# Patient Record
Sex: MALE | Employment: FULL TIME | URBAN - METROPOLITAN AREA
[De-identification: names, ages, dates, MRNs, and addresses within clinical notes are randomized per-mention and may not be internally consistent; named-entity substitution may affect disease eponyms.]

---

## 2017-01-26 DIAGNOSIS — I10 ESSENTIAL HYPERTENSION: Primary | ICD-10-CM

## 2017-01-26 RX ORDER — LISINOPRIL/HYDROCHLOROTHIAZIDE 10-12.5 MG
1 TABLET ORAL DAILY
Qty: 30 TABLET | Refills: 0 | Status: SHIPPED | OUTPATIENT
Start: 2017-01-26 | End: 2017-03-24

## 2017-01-26 NOTE — TELEPHONE ENCOUNTER
lisinopril-hydrochlorothiazide       Last Written Prescription Date: 12/29/16  Last Fill Quantity: 30, # refills: 0  Last Office Visit with G, P or [facility] prescribing provider: 10/13/16       POTASSIUM   Date Value Ref Range Status   08/17/2016 4.1 3.4 - 5.3 mmol/L Final     Comment:     Specimen slightly hemolyzed, potassium may be falsely elevated     CREATININE   Date Value Ref Range Status   08/17/2016 0.77 0.66 - 1.25 mg/dL Final     BP Readings from Last 3 Encounters:   10/13/16 163/90   10/07/16 150/100   08/17/16 178/108

## 2017-03-24 DIAGNOSIS — I10 ESSENTIAL HYPERTENSION: ICD-10-CM

## 2017-03-24 RX ORDER — LISINOPRIL/HYDROCHLOROTHIAZIDE 10-12.5 MG
1 TABLET ORAL DAILY
Qty: 30 TABLET | Refills: 0 | Status: SHIPPED | OUTPATIENT
Start: 2017-03-24 | End: 2017-04-25

## 2017-03-24 NOTE — TELEPHONE ENCOUNTER
lisinopril-hydrochlorothiazide (PRINZIDE/ZESTORETIC) 10-12.5 MG per tablet      Last Written Prescription Date: 1/26/2017  Last Fill Quantity: 30, # refills: 0  Last Office Visit with G, [facility] or [facility] prescribing provider: 8/17/2016       Potassium   Date Value Ref Range Status   08/17/2016 4.1 3.4 - 5.3 mmol/L Final     Comment:     Specimen slightly hemolyzed, potassium may be falsely elevated     Creatinine   Date Value Ref Range Status   08/17/2016 0.77 0.66 - 1.25 mg/dL Final     BP Readings from Last 3 Encounters:   10/13/16 163/90   10/07/16 (!) 150/100   08/17/16 (!) 178/108

## 2017-04-25 DIAGNOSIS — I10 ESSENTIAL HYPERTENSION: ICD-10-CM

## 2017-04-25 NOTE — TELEPHONE ENCOUNTER
lisinopri/hctz      Last Written Prescription Date: 3/24/17  Last Fill Quantity: 30, # refills: 0  Last Office Visit with G, P or [facility] prescribing provider: 8/17/16       Potassium   Date Value Ref Range Status   08/17/2016 4.1 3.4 - 5.3 mmol/L Final     Comment:     Specimen slightly hemolyzed, potassium may be falsely elevated     Creatinine   Date Value Ref Range Status   08/17/2016 0.77 0.66 - 1.25 mg/dL Final     BP Readings from Last 3 Encounters:   10/13/16 163/90   10/07/16 (!) 150/100   08/17/16 (!) 178/108

## 2017-04-26 RX ORDER — LISINOPRIL/HYDROCHLOROTHIAZIDE 10-12.5 MG
TABLET ORAL
Qty: 30 TABLET | Refills: 0 | Status: SHIPPED | OUTPATIENT
Start: 2017-04-26 | End: 2017-07-01

## 2017-07-01 DIAGNOSIS — I10 ESSENTIAL HYPERTENSION: ICD-10-CM

## 2017-07-05 NOTE — TELEPHONE ENCOUNTER
lisinopril      Last Written Prescription Date: 04/26/17  Last Fill Quantity: 30, # refills: 0  Last Office Visit with G, P or [facility] prescribing provider: 08/17/16       Potassium   Date Value Ref Range Status   08/17/2016 4.1 3.4 - 5.3 mmol/L Final     Comment:     Specimen slightly hemolyzed, potassium may be falsely elevated     Creatinine   Date Value Ref Range Status   08/17/2016 0.77 0.66 - 1.25 mg/dL Final     BP Readings from Last 3 Encounters:   10/13/16 163/90   10/07/16 (!) 150/100   08/17/16 (!) 178/108

## 2017-07-06 RX ORDER — LISINOPRIL/HYDROCHLOROTHIAZIDE 10-12.5 MG
TABLET ORAL
Qty: 30 TABLET | Refills: 0 | Status: SHIPPED | OUTPATIENT
Start: 2017-07-06 | End: 2017-07-14 | Stop reason: DRUGHIGH

## 2017-07-07 ENCOUNTER — OFFICE VISIT (OUTPATIENT)
Dept: FAMILY MEDICINE | Facility: CLINIC | Age: 42
End: 2017-07-07
Payer: COMMERCIAL

## 2017-07-07 VITALS
WEIGHT: 240 LBS | SYSTOLIC BLOOD PRESSURE: 178 MMHG | DIASTOLIC BLOOD PRESSURE: 100 MMHG | BODY MASS INDEX: 38.45 KG/M2 | OXYGEN SATURATION: 100 % | TEMPERATURE: 97.5 F | HEART RATE: 89 BPM

## 2017-07-07 DIAGNOSIS — E11.9 TYPE 2 DIABETES MELLITUS WITHOUT COMPLICATION, WITHOUT LONG-TERM CURRENT USE OF INSULIN (H): ICD-10-CM

## 2017-07-07 DIAGNOSIS — S29.019A STRAIN OF THORACIC SPINE, INITIAL ENCOUNTER: ICD-10-CM

## 2017-07-07 DIAGNOSIS — I10 HYPERTENSION, GOAL BELOW 140/90: Primary | ICD-10-CM

## 2017-07-07 DIAGNOSIS — E66.01 SEVERE OBESITY (BMI 35.0-39.9): ICD-10-CM

## 2017-07-07 DIAGNOSIS — M54.6 ACUTE RIGHT-SIDED THORACIC BACK PAIN: ICD-10-CM

## 2017-07-07 PROCEDURE — 99214 OFFICE O/P EST MOD 30 MIN: CPT | Performed by: NURSE PRACTITIONER

## 2017-07-07 RX ORDER — LISINOPRIL AND HYDROCHLOROTHIAZIDE 20; 25 MG/1; MG/1
1 TABLET ORAL DAILY
Qty: 90 TABLET | Refills: 3 | Status: ON HOLD | OUTPATIENT
Start: 2017-07-07 | End: 2019-03-02

## 2017-07-07 RX ORDER — CYCLOBENZAPRINE HCL 10 MG
10 TABLET ORAL 3 TIMES DAILY PRN
Qty: 60 TABLET | Refills: 1 | Status: SHIPPED | OUTPATIENT
Start: 2017-07-07 | End: 2017-07-14

## 2017-07-07 RX ORDER — HYDROCODONE BITARTRATE AND ACETAMINOPHEN 5; 325 MG/1; MG/1
1-2 TABLET ORAL EVERY 8 HOURS PRN
Qty: 20 TABLET | Refills: 0 | Status: SHIPPED | OUTPATIENT
Start: 2017-07-07 | End: 2017-07-18

## 2017-07-07 RX ORDER — ACETAMINOPHEN 325 MG/1
650 TABLET ORAL EVERY 4 HOURS PRN
Qty: 100 TABLET | Refills: 0 | Status: SHIPPED | OUTPATIENT
Start: 2017-07-07

## 2017-07-07 ASSESSMENT — PAIN SCALES - GENERAL: PAINLEVEL: SEVERE PAIN (7)

## 2017-07-07 NOTE — PATIENT INSTRUCTIONS
Norco is for breakthrough pain. This will not be refilled. If you take, take in substitution of a tylenol to make sure you're not taking too much tylenol    Follow up in 1 week    I increased your blood pressure medication. Take 2 tablets until you run out and then call pharmacy to  your new prescription

## 2017-07-07 NOTE — NURSING NOTE
"Chief Complaint   Patient presents with     Back Pain       Initial BP (!) 187/114 (BP Location: Right arm, Patient Position: Chair, Cuff Size: Adult Large)  Pulse 89  Temp 97.5  F (36.4  C) (Oral)  Wt 240 lb (108.9 kg)  SpO2 100%  BMI 38.45 kg/m2 Estimated body mass index is 38.45 kg/(m^2) as calculated from the following:    Height as of 10/13/16: 5' 6.25\" (1.683 m).    Weight as of this encounter: 240 lb (108.9 kg).  Medication Reconciliation: complete.    EDWIGE Mendoza MA      "

## 2017-07-07 NOTE — PROGRESS NOTES
SUBJECTIVE:                                                    Eliseo Sims is a 42 year old male who presents to clinic today for the following health issues:      Patient is here today with Right lower back pain, has thrown it out again 2 days ago.    Patient also has not been taking his BP meds. Very  Well and today his blood pressure is very high.    He stopped all of his medications a few months ago  Can not give a reason  Reports his diabetes meds were DC'd after intended weight loss  Denies headache, dizziness, vision change, word finding difficulty, nausea    Is a   Denies injury or trauma  Seen for low back pain in the past  2 days ago developed new type of low back pain without hx of injury. This is worse than previously  Right low back pain  Radiates midline with certain positions  Denies paresthesias  Denies bowel or bladder incontinence  Took ibuprofen. A friend gave him a painkiller (not sure the name) made him tired.         Problem list and histories reviewed & adjusted, as indicated.  Additional history: none    Patient Active Problem List   Diagnosis     Hyperlipidemia LDL goal <100     Type 2 diabetes mellitus without complication (H)     Vitamin D deficiency     Former smoker     overweight     Essential hypertension     History reviewed. No pertinent surgical history.    Social History   Substance Use Topics     Smoking status: Former Smoker     Packs/day: 1.50     Years: 16.00     Types: Cigarettes     Quit date: 4/17/2013     Smokeless tobacco: Never Used     Alcohol use Yes      Comment: 10 drinks weekly     Family History   Problem Relation Age of Onset     Family History Negative Father      Arthritis Mother          Current Outpatient Prescriptions   Medication Sig Dispense Refill     lisinopril-hydrochlorothiazide (PRINZIDE/ZESTORETIC) 20-25 MG per tablet Take 1 tablet by mouth daily 90 tablet 3     cyclobenzaprine (FLEXERIL) 10 MG tablet Take 1 tablet (10 mg) by  mouth 3 times daily as needed for muscle spasms 60 tablet 1     HYDROcodone-acetaminophen (NORCO) 5-325 MG per tablet Take 1-2 tablets by mouth every 8 hours as needed for pain maximum 3 tablet(s) per day 20 tablet 0     acetaminophen (TYLENOL) 325 MG tablet Take 2 tablets (650 mg) by mouth every 4 hours as needed for mild pain 100 tablet 0     lisinopril-hydrochlorothiazide (PRINZIDE/ZESTORETIC) 10-12.5 MG per tablet TAKE 1 TABLET BY MOUTH EVERY DAY (Patient not taking: Reported on 7/7/2017) 30 tablet 0     cyclobenzaprine (FLEXERIL) 10 MG tablet Take 0.5-1 tablets (5-10 mg) by mouth 2 times daily as needed for muscle spasms (Patient not taking: Reported on 7/7/2017) 30 tablet 0     Methocarbamol (ROBAXIN PO)        metFORMIN (GLUCOPHAGE) 1000 MG tablet Take 1 tablet (1,000 mg) by mouth 2 times daily (with meals) Due for diabetes and blood pressue follow up visit. (Patient not taking: Reported on 7/7/2017) 60 tablet 0     UNABLE TO FIND Please give pt. Either brand name Roche or [facility] Diabetic Kit.( Per his insurance.)  Also fill Test stripes:  He test up to 3 times a day. (per his insurance)  Lancets:  Up to 3 times a day (per his insurance) (Patient not taking: Reported on 7/7/2017) 270 each 3     glucose blood VI test strips (ONE TOUCH ULTRA TEST) strip Use to test blood sugars two times daily or as directed. (Patient not taking: Reported on 7/7/2017) 100 strip 3     NEW MED 12mg. Dose E cigs.        Blood Glucose Monitoring Suppl (BLOOD GLUCOSE METER) KIT 1 Device 2 times daily. (Patient not taking: Reported on 7/7/2017) 1 kit 0     BL LANCETS MISC 1 Device 2 times daily. (Patient not taking: Reported on 7/7/2017) 180 each 3       Reviewed and updated as needed this visit by clinical staff  Tobacco  Allergies  Meds  Med Hx  Surg Hx  Fam Hx  Soc Hx      Reviewed and updated as needed this visit by Provider         ROS:  Constitutional, HEENT, cardiovascular, pulmonary, gi and gu systems are negative,  except as otherwise noted.    OBJECTIVE:     BP (!) 178/100  Pulse 89  Temp 97.5  F (36.4  C) (Oral)  Wt 240 lb (108.9 kg)  SpO2 100%  BMI 38.45 kg/m2  Body mass index is 38.45 kg/(m^2).  GENERAL: healthy, alert and no distress  NECK: no adenopathy, no asymmetry, masses, or scars and thyroid normal to palpation  RESP: lungs clear to auscultation - no rales, rhonchi or wheezes  CV: regular rate and rhythm, normal S1 S2, no S3 or S4, no murmur, click or rub, no peripheral edema and peripheral pulses strong  MS: No pain to palpation thoracic or lumbar spinous processes. Pain to palpation right paraspinal thoracic region. Pain with flexion, extension and lateral rotation thoracic spine. Upper and lower extremity strenght 5/5 and symmetric  SKIN: no suspicious lesions or rashes  NEURO: Normal strength and tone, mentation intact and speech normal, sensation grossly intact  PSYCH: mentation appears normal, affect normal/bright    Diagnostic Test Results:  none     ASSESSMENT/PLAN:       ICD-10-CM    1. Hypertension, goal below 140/90 I10 lisinopril-hydrochlorothiazide (PRINZIDE/ZESTORETIC) 20-25 MG per tablet   2. Type 2 diabetes mellitus without complication, without long-term current use of insulin (H) E11.9 Hemoglobin A1c     Basic metabolic panel  (Ca, Cl, CO2, Creat, Gluc, K, Na, BUN)     CANCELED: HEMOGLOBIN A1C     CANCELED: Basic metabolic panel   3. Severe obesity (BMI 35.0-39.9) (H) E66.01    4. Acute right-sided thoracic back pain M54.6 cyclobenzaprine (FLEXERIL) 10 MG tablet     HYDROcodone-acetaminophen (NORCO) 5-325 MG per tablet     acetaminophen (TYLENOL) 325 MG tablet   5. Strain of thoracic spine, initial encounter S29.019A      Restart BP med. Increased the dose and I expect will need further titration in future, or added medications  Discussed reducing sodium in diet, increasing exercise (though this is hard in immediate future with acute back pain)  Weight loss will help to reduce future back pain  flares and help with HTN and diabetes  Recheck A1c  Do not want him taking NSAIDs at this time with severe HTN  Recommend extra strength tylenol. Did write for script for Norco for breakthrough pain. Patient aware it will not be refilled  Follow up 1 week. Consider physical therapy referral    Patient Instructions   Norco is for breakthrough pain. This will not be refilled. If you take, take in substitution of a tylenol to make sure you're not taking too much tylenol    Follow up in 1 week    I increased your blood pressure medication. Take 2 tablets until you run out and then call pharmacy to  your new prescription      SARAH Hare [facility]

## 2017-07-07 NOTE — MR AVS SNAPSHOT
After Visit Summary   7/7/2017    Eliseo Sims    MRN: [id number]           Patient Information     Date Of Birth          1975        Visit Information        Provider Department      7/7/2017 9:40 AM Irina Shine APRN CNP [facility]        Today's Diagnoses     Hypertension, goal below 140/90    -  1    Acute right-sided thoracic back pain        Type 2 diabetes mellitus without complication, without long-term current use of insulin (H)          Care Instructions    Norco is for breakthrough pain. This will not be refilled. If you take, take in substitution of a tylenol to make sure you're not taking too much tylenol    Follow up in 1 week    I increased your blood pressure medication. Take 2 tablets until you run out and then call pharmacy to  your new prescription          Follow-ups after your visit        Who to contact     If you have questions or need follow up information about today's clinic visit or your schedule please contact [facility] directly at [phone].  Normal or non-critical lab and imaging results will be communicated to you by [employer], letter or phone within 4 business days after the clinic has received the results. If you do not hear from us within 7 days, please contact the clinic through [facility] or phone. If you have a critical or abnormal lab result, we will notify you by phone as soon as possible.  Submit refill requests through [employer] or call your pharmacy and they will forward the refill request to us. Please allow 3 business days for your refill to be completed.          Additional Information About Your Visit        [employer] Information     [employer] gives you secure access to your electronic health record. If you see a primary care provider, you can also send messages to your care team and make appointments. If you have questions, please call your primary care clinic.  If you do not have a  primary care provider, please call [phone] and they will assist you.        Care EveryWhere ID     This is your Care EveryWhere ID. This could be used by other organizations to access your [place] medical records  [id number]        Your Vitals Were     Pulse Temperature Pulse Oximetry BMI (Body Mass Index)          89 97.5  F (36.4  C) (Oral) 100% 38.45 kg/m2         Blood Pressure from Last 3 Encounters:   07/07/17 (!) 178/100   10/13/16 163/90   10/07/16 (!) 150/100    Weight from Last 3 Encounters:   07/07/17 240 lb (108.9 kg)   10/13/16 222 lb (100.7 kg)   10/07/16 222 lb (100.7 kg)              We Performed the Following     Basic metabolic panel     HEMOGLOBIN A1C          Today's Medication Changes          These changes are accurate as of: 7/7/17 10:13 AM.  If you have any questions, ask your nurse or doctor.               Start taking these medicines.        Dose/Directions    acetaminophen 325 MG tablet   Commonly known as:  TYLENOL   Used for:  Acute right-sided thoracic back pain   Started by:  Irina Shine APRN CNP        Dose:  650 mg   Take 2 tablets (650 mg) by mouth every 4 hours as needed for mild pain   Quantity:  100 tablet   Refills:  0       HYDROcodone-acetaminophen 5-325 MG per tablet   Commonly known as:  NORCO   Used for:  Acute right-sided thoracic back pain   Started by:  Irina Shine APRN CNP        Dose:  1-2 tablet   Take 1-2 tablets by mouth every 8 hours as needed for pain maximum 3 tablet(s) per day   Quantity:  20 tablet   Refills:  0         These medicines have changed or have updated prescriptions.        Dose/Directions    * cyclobenzaprine 10 MG tablet   Commonly known as:  FLEXERIL   This may have changed:  Another medication with the same name was added. Make sure you understand how and when to take each.   Used for:  Acute bilateral low back pain without sciatica   Changed by:  Tiffanie Arvizu MD        Dose:  5-10 mg   Take 0.5-1 tablets  (5-10 mg) by mouth 2 times daily as needed for muscle spasms   Quantity:  30 tablet   Refills:  0       * cyclobenzaprine 10 MG tablet   Commonly known as:  FLEXERIL   This may have changed:  You were already taking a medication with the same name, and this prescription was added. Make sure you understand how and when to take each.   Used for:  Acute right-sided thoracic back pain   Changed by:  Irina Shine APRN CNP        Dose:  10 mg   Take 1 tablet (10 mg) by mouth 3 times daily as needed for muscle spasms   Quantity:  60 tablet   Refills:  1       * lisinopril-hydrochlorothiazide 10-12.5 MG per tablet   Commonly known as:  PRINZIDE/ZESTORETIC   This may have changed:  Another medication with the same name was added. Make sure you understand how and when to take each.   Used for:  Essential hypertension   Changed by:  Luigi Kimble MD        TAKE 1 TABLET BY MOUTH EVERY DAY   Quantity:  30 tablet   Refills:  0       * lisinopril-hydrochlorothiazide 20-25 MG per tablet   Commonly known as:  PRINZIDE/ZESTORETIC   This may have changed:  You were already taking a medication with the same name, and this prescription was added. Make sure you understand how and when to take each.   Used for:  Hypertension, goal below 140/90   Changed by:  Irina Shine APRN CNP        Dose:  1 tablet   Take 1 tablet by mouth daily   Quantity:  90 tablet   Refills:  3       * Notice:  This list has 4 medication(s) that are the same as other medications prescribed for you. Read the directions carefully, and ask your doctor or other care provider to review them with you.         Where to get your medicines      These medications were sent to [facility] [address] [place], [place] - [address]  [address] [address]     Phone:  [phone]     acetaminophen 325 MG tablet    cyclobenzaprine 10 MG tablet    lisinopril-hydrochlorothiazide 20-25 MG per tablet         Some of these  will need a paper prescription and others can be bought over the counter.  Ask your nurse if you have questions.     Bring a paper prescription for each of these medications     HYDROcodone-acetaminophen 5-325 MG per tablet                Primary Care Provider Office Phone # Fax #    Luigi Kimble [phone]       [facility] [address] [place] [address]        Equal Access to Services     DANNI MITCHELL : Hadii aad ku hadasho Soomaali, waaxda luqadaha, qaybta kaalmada adeegyada, waxay idiin hayaan adeeg khtitosh lajoanne ah. So [facility] [phone].    ATENCIÓN: Si habla espisaak, tiene a johnson disposición servicios gratuitos de asistencia lingüística. Llame al [phone].    We comply with applicable federal civil rights laws and [place] laws. We do not discriminate on the basis of race, color, national origin, age, disability sex, sexual orientation or gender identity.            Thank you!     Thank you for choosing [facility]  for your care. Our goal is always to provide you with excellent care. Hearing back from our patients is one way we can continue to improve our services. Please take a few minutes to complete the written survey that you may receive in the mail after your visit with us. Thank you!             Your Updated Medication List - Protect others around you: Learn how to safely use, store and throw away your medicines at www.disposemymeds.org.          This list is accurate as of: 7/7/17 10:13 AM.  Always use your most recent med list.                   Brand Name Dispense Instructions for use Diagnosis    acetaminophen 325 MG tablet    TYLENOL    100 tablet    Take 2 tablets (650 mg) by mouth every 4 hours as needed for mild pain    Acute right-sided thoracic back pain       BL LANCETS Misc     180 each    1 Device 2 times daily.    Type 2 diabetes, HbA1c goal < 7% (H)       blood glucose monitoring meter device kit    no brand specified     1 kit    1 Device 2 times daily.    Type 2 diabetes, HbA1c goal < 7% (H)       blood glucose monitoring test strip    ONE TOUCH ULTRA    100 strip    Use to test blood sugars two times daily or as directed.    Type 2 diabetes, HbA1c goal < 7% (H)       * cyclobenzaprine 10 MG tablet    FLEXERIL    30 tablet    Take 0.5-1 tablets (5-10 mg) by mouth 2 times daily as needed for muscle spasms    Acute bilateral low back pain without sciatica       * cyclobenzaprine 10 MG tablet    FLEXERIL    60 tablet    Take 1 tablet (10 mg) by mouth 3 times daily as needed for muscle spasms    Acute right-sided thoracic back pain       HYDROcodone-acetaminophen 5-325 MG per tablet    NORCO    20 tablet    Take 1-2 tablets by mouth every 8 hours as needed for pain maximum 3 tablet(s) per day    Acute right-sided thoracic back pain       * lisinopril-hydrochlorothiazide 10-12.5 MG per tablet    PRINZIDE/ZESTORETIC    30 tablet    TAKE 1 TABLET BY MOUTH EVERY DAY    Essential hypertension       * lisinopril-hydrochlorothiazide 20-25 MG per tablet    PRINZIDE/ZESTORETIC    90 tablet    Take 1 tablet by mouth daily    Hypertension, goal below 140/90       metFORMIN 1000 MG tablet    GLUCOPHAGE    60 tablet    Take 1 tablet (1,000 mg) by mouth 2 times daily (with meals) Due for diabetes and blood pressue follow up visit.    Type 2 diabetes, HbA1c goal < 7% (H)       * NEW MED      12mg. Dose E cigs.        ROBAXIN PO           * UNABLE TO FIND     270 each    Please give pt. Either brand name Roche or [employer] Diabetic Kit.( Per his insurance.) Also fill Test stripes:  He test up to 3 times a day. (per his insurance) Lancets:  Up to 3 times a day (per his insurance)    Type 2 diabetes, HbA1c goal < 7% (H)       * Notice:  This list has 6 medication(s) that are the same as other medications prescribed for you. Read the directions carefully, and ask your doctor or other care provider to review them with you.

## 2017-07-14 ENCOUNTER — OFFICE VISIT (OUTPATIENT)
Dept: FAMILY MEDICINE | Facility: CLINIC | Age: 42
End: 2017-07-14
Payer: COMMERCIAL

## 2017-07-14 VITALS
BODY MASS INDEX: 38.45 KG/M2 | TEMPERATURE: 98.8 F | HEART RATE: 100 BPM | SYSTOLIC BLOOD PRESSURE: 128 MMHG | OXYGEN SATURATION: 100 % | DIASTOLIC BLOOD PRESSURE: 83 MMHG | WEIGHT: 240 LBS

## 2017-07-14 DIAGNOSIS — I10 ESSENTIAL HYPERTENSION: ICD-10-CM

## 2017-07-14 DIAGNOSIS — S39.012D STRAIN OF LUMBAR REGION, SUBSEQUENT ENCOUNTER: Primary | ICD-10-CM

## 2017-07-14 DIAGNOSIS — E11.9 TYPE 2 DIABETES MELLITUS WITHOUT COMPLICATION, WITHOUT LONG-TERM CURRENT USE OF INSULIN (H): ICD-10-CM

## 2017-07-14 LAB
ANION GAP SERPL CALCULATED.3IONS-SCNC: 10 MMOL/L (ref 3–14)
BUN SERPL-MCNC: 10 MG/DL (ref 7–30)
CALCIUM SERPL-MCNC: 9.5 MG/DL (ref 8.5–10.1)
CHLORIDE SERPL-SCNC: 102 MMOL/L (ref 94–109)
CHOLEST SERPL-MCNC: 162 MG/DL
CO2 SERPL-SCNC: 25 MMOL/L (ref 20–32)
CREAT SERPL-MCNC: 0.82 MG/DL (ref 0.66–1.25)
GFR SERPL CREATININE-BSD FRML MDRD: ABNORMAL ML/MIN/1.7M2
GLUCOSE SERPL-MCNC: 149 MG/DL (ref 70–99)
HBA1C MFR BLD: 6.7 % (ref 4.3–6)
HDLC SERPL-MCNC: 38 MG/DL
LDLC SERPL CALC-MCNC: 91 MG/DL
NONHDLC SERPL-MCNC: 124 MG/DL
POTASSIUM SERPL-SCNC: 4.3 MMOL/L (ref 3.4–5.3)
SODIUM SERPL-SCNC: 137 MMOL/L (ref 133–144)
TRIGL SERPL-MCNC: 163 MG/DL

## 2017-07-14 PROCEDURE — 36415 COLL VENOUS BLD VENIPUNCTURE: CPT | Performed by: NURSE PRACTITIONER

## 2017-07-14 PROCEDURE — 99214 OFFICE O/P EST MOD 30 MIN: CPT | Performed by: NURSE PRACTITIONER

## 2017-07-14 PROCEDURE — 83036 HEMOGLOBIN GLYCOSYLATED A1C: CPT | Performed by: NURSE PRACTITIONER

## 2017-07-14 PROCEDURE — 80048 BASIC METABOLIC PNL TOTAL CA: CPT | Performed by: NURSE PRACTITIONER

## 2017-07-14 PROCEDURE — 80061 LIPID PANEL: CPT | Performed by: NURSE PRACTITIONER

## 2017-07-14 RX ORDER — DICLOFENAC SODIUM 75 MG/1
75 TABLET, DELAYED RELEASE ORAL 2 TIMES DAILY PRN
Qty: 60 TABLET | Refills: 1 | Status: SHIPPED | OUTPATIENT
Start: 2017-07-14

## 2017-07-14 ASSESSMENT — PAIN SCALES - GENERAL: PAINLEVEL: SEVERE PAIN (6)

## 2017-07-14 NOTE — MR AVS SNAPSHOT
After Visit Summary   7/14/2017    Eliseo Sims    MRN: [id number]           Patient Information     Date Of Birth          1975        Visit Information        Provider Department      7/14/2017 10:20 AM Irina Shine APRN [facility]        Today's Diagnoses     Strain of lumbar region, subsequent encounter    -  1    Essential hypertension        Type 2 diabetes mellitus without complication, without long-term current use of insulin (H)          Care Instructions    Schedule with physical. If pain worsening, or does not resolve after completion of recommended therapy, let me know and we can discuss getting an MRI    Start taking diclofenac twice daily. Do not take ibuprofen or other over the counter NSAIDs while taking this medication  Can also take tylenol in between doses              Follow-ups after your visit        Additional Services     [facility] PT, HAND, AND CHIROPRACTIC REFERRAL       **This order will print in the [facility] Scheduling Office**    Physical Therapy, Hand Therapy and Chiropractic Care are available through:    *[facility]  *[facility]  *[facility]    Call one number to schedule at any of the above locations: [phone].    Your provider has referred you to: Physical Therapy at [facility] or [facility]    Indication/Reason for Referral: Low Back Pain  Onset of Illness: 10 days  Therapy Orders: Evaluate and Treat  Special Programs: None  Special Request: None    Li Jefferson      Additional Comments for the Therapist or Chiropractor: Denies injury. A lot of heavy lifting at work    Please be aware that coverage of these services is subject to the terms and limitations of your health insurance plan.  Call member services at your health plan with any benefit or coverage questions.      Please bring the following to your appointment:    *Your personal calendar for scheduling future  appointments  *Comfortable clothing                  Who to contact     If you have questions or need follow up information about today's clinic visit or your schedule please contact [facility] directly at [phone].  Normal or non-critical lab and imaging results will be communicated to you by MyChart, letter or phone within 4 business days after the clinic has received the results. If you do not hear from us within 7 days, please contact the clinic through MyChart or phone. If you have a critical or abnormal lab result, we will notify you by phone as soon as possible.  Submit refill requests through [employer] or call your pharmacy and they will forward the refill request to us. Please allow 3 business days for your refill to be completed.          Additional Information About Your Visit        [employer] Information     [employer] gives you secure access to your electronic health record. If you see a primary care provider, you can also send messages to your care team and make appointments. If you have questions, please call your primary care clinic.  If you do not have a primary care provider, please call [phone] and they will assist you.        Care EveryWhere ID     This is your Care EveryWhere ID. This could be used by other organizations to access your [place] medical records  [id number]        Your Vitals Were     Pulse Temperature Pulse Oximetry BMI (Body Mass Index)          100 98.8  F (37.1  C) (Oral) 100% 38.45 kg/m2         Blood Pressure from Last 3 Encounters:   07/14/17 128/83   07/07/17 (!) 178/100   10/13/16 163/90    Weight from Last 3 Encounters:   07/14/17 240 lb (108.9 kg)   07/07/17 240 lb (108.9 kg)   10/13/16 222 lb (100.7 kg)              We Performed the Following     Basic metabolic panel  (Ca, Cl, CO2, Creat, Gluc, K, Na, BUN)     Hemoglobin A1c     [facility] PT, HAND, AND CHIROPRACTIC REFERRAL     Lipid Profile (Chol, Trig, HDL, LDL calc)          Today's Medication  Changes          These changes are accurate as of: 7/14/17 10:49 AM.  If you have any questions, ask your nurse or doctor.               Start taking these medicines.        Dose/Directions    diclofenac 75 MG EC tablet   Commonly known as:  VOLTAREN   Used for:  Strain of lumbar region, subsequent encounter   Started by:  Irina Shine APRN CNP        Dose:  75 mg   Take 1 tablet (75 mg) by mouth 2 times daily as needed for moderate pain   Quantity:  60 tablet   Refills:  1         These medicines have changed or have updated prescriptions.        Dose/Directions    lisinopril-hydrochlorothiazide 20-25 MG per tablet   Commonly known as:  PRINZIDE/ZESTORETIC   This may have changed:  Another medication with the same name was removed. Continue taking this medication, and follow the directions you see here.   Used for:  Hypertension, goal below 140/90   Changed by:  Irina Shine APRN CNP        Dose:  1 tablet   Take 1 tablet by mouth daily   Quantity:  90 tablet   Refills:  3            Where to get your medicines      These medications were sent to [facility] [address]  [address], [facility] [address]     Phone:  [phone]     diclofenac 75 MG EC tablet                Primary Care Provider Office Phone # Fax #    Liugi Kimble [phone]       [facility] [address] [place] [id number]        Equal Access to Services     DANNI MITCHELL : Bhavin sellers hadasho Sodavid, waaxda luqadaha, qaybta kaalmada adejoeyyada, maddy skaggs. So [facility] [phone].    ATENCIÓN: Si habla español, tiene a johnson disposición servicios gratuitos de asistencia lingüística. Dae al [phone].    We comply with applicable federal civil rights laws and [place] laws. We do not discriminate on the basis of race, color, national origin, age, disability sex, sexual orientation  or gender identity.            Thank you!     Thank you for choosing [facility]  for your care. Our goal is always to provide you with excellent care. Hearing back from our patients is one way we can continue to improve our services. Please take a few minutes to complete the written survey that you may receive in the mail after your visit with us. Thank you!             Your Updated Medication List - Protect others around you: Learn how to safely use, store and throw away your medicines at www.disposemymeds.org.          This list is accurate as of: 7/14/17 10:49 AM.  Always use your most recent med list.                   Brand Name Dispense Instructions for use Diagnosis    acetaminophen 325 MG tablet    TYLENOL    100 tablet    Take 2 tablets (650 mg) by mouth every 4 hours as needed for mild pain    Acute right-sided thoracic back pain       BL LANCETS Misc     180 each    1 Device 2 times daily.    Type 2 diabetes, HbA1c goal < 7% (H)       blood glucose monitoring meter device kit    no brand specified    1 kit    1 Device 2 times daily.    Type 2 diabetes, HbA1c goal < 7% (H)       blood glucose monitoring test strip    ONE TOUCH ULTRA    100 strip    Use to test blood sugars two times daily or as directed.    Type 2 diabetes, HbA1c goal < 7% (H)       cyclobenzaprine 10 MG tablet    FLEXERIL    30 tablet    Take 0.5-1 tablets (5-10 mg) by mouth 2 times daily as needed for muscle spasms    Acute bilateral low back pain without sciatica       diclofenac 75 MG EC tablet    VOLTAREN    60 tablet    Take 1 tablet (75 mg) by mouth 2 times daily as needed for moderate pain    Strain of lumbar region, subsequent encounter       HYDROcodone-acetaminophen 5-325 MG per tablet    NORCO    20 tablet    Take 1-2 tablets by mouth every 8 hours as needed for pain maximum 3 tablet(s) per day    Acute right-sided thoracic back pain       lisinopril-hydrochlorothiazide 20-25 MG per tablet     PRINZIDE/ZESTORETIC    90 tablet    Take 1 tablet by mouth daily    Hypertension, goal below 140/90       metFORMIN 1000 MG tablet    GLUCOPHAGE    60 tablet    Take 1 tablet (1,000 mg) by mouth 2 times daily (with meals) Due for diabetes and blood pressue follow up visit.    Type 2 diabetes, HbA1c goal < 7% (H)       * NEW MED      12mg. Dose E cigs.        * UNABLE TO FIND     270 each    Please give pt. Either brand name Roche or [employer] Diabetic Kit.( Per his insurance.) Also fill Test stripes:  He test up to 3 times a day. (per his insurance) Lancets:  Up to 3 times a day (per his insurance)    Type 2 diabetes, HbA1c goal < 7% (H)       * Notice:  This list has 2 medication(s) that are the same as other medications prescribed for you. Read the directions carefully, and ask your doctor or other care provider to review them with you.

## 2017-07-14 NOTE — NURSING NOTE
"Chief Complaint   Patient presents with     Back Pain     Recheck back pain       Initial /83 (BP Location: Left arm, Patient Position: Chair, Cuff Size: Adult Large)  Pulse 100  Temp 98.8  F (37.1  C) (Oral)  Wt 240 lb (108.9 kg)  SpO2 100%  BMI 38.45 kg/m2 Estimated body mass index is 38.45 kg/(m^2) as calculated from the following:    Height as of 10/13/16: 5' 6.25\" (1.683 m).    Weight as of this encounter: 240 lb (108.9 kg).  Medication Reconciliation: complete.  EDWIGE Mendoza MA      "

## 2017-07-14 NOTE — PROGRESS NOTES
SUBJECTIVE:                                                    Eliseo Sims is a 42 year old male who presents to clinic today for the following health issues:      Patient is here today to follow up on his back pain and HTN, his back is a little bit better, but working is still painful and difficult.     Seen in clinic 7/7 for HTN and low back pain  BP improved and below goal 140/90  Tolerating lisinopril  Denies side effects. No S/S of hypotension    Low back pain has shown little improvement  Had to reduce work hours this week  Is a   Frequent, low impact movements of low back  Feels like he hasn't been able to rest his back      Problem list and histories reviewed & adjusted, as indicated.  Additional history: none    Patient Active Problem List   Diagnosis     Hyperlipidemia LDL goal <100     Type 2 diabetes mellitus without complication (H)     Vitamin D deficiency     Former smoker     overweight     Essential hypertension     History reviewed. No pertinent surgical history.    Social History   Substance Use Topics     Smoking status: Former Smoker     Packs/day: 1.50     Years: 16.00     Types: Cigarettes     Quit date: 4/17/2013     Smokeless tobacco: Never Used     Alcohol use Yes      Comment: 10 drinks weekly     Family History   Problem Relation Age of Onset     Family History Negative Father      Arthritis Mother            Reviewed and updated as needed this visit by clinical staff  Tobacco  Allergies  Meds  Med Hx  Surg Hx  Fam Hx  Soc Hx      Reviewed and updated as needed this visit by Provider         ROS:  Constitutional, HEENT, cardiovascular, pulmonary, gi and gu systems are negative, except as otherwise noted.    OBJECTIVE:     /83 (BP Location: Left arm, Patient Position: Chair, Cuff Size: Adult Large)  Pulse 100  Temp 98.8  F (37.1  C) (Oral)  Wt 240 lb (108.9 kg)  SpO2 100%  BMI 38.45 kg/m2  Body mass index is 38.45 kg/(m^2).  GENERAL: healthy, alert  and no distress  RESP: lungs clear to auscultation - no rales, rhonchi or wheezes  CV: regular rate and rhythm, normal S1 S2, no S3 or S4, no murmur, click or rub, no peripheral edema and peripheral pulses strong  MS: Gait steady without assistive devices. No guarding    Diagnostic Test Results:  none     ASSESSMENT/PLAN:       ICD-10-CM    1. Strain of lumbar region, subsequent encounter S39.012D [facility] PT, HAND, AND CHIROPRACTIC REFERRAL     diclofenac (VOLTAREN) 75 MG EC tablet   2. Essential hypertension I10    3. Type 2 diabetes mellitus without complication, without long-term current use of insulin (H) E11.9 Hemoglobin A1c     Basic metabolic panel  (Ca, Cl, CO2, Creat, Gluc, K, Na, BUN)     Lipid Profile (Chol, Trig, HDL, LDL calc)     BP at goal. Continue at current dosage  Recommend exercise (when back pain stable). Weight loss will held with further BP reduction and to reduce flares of back pain  No red flag symptoms. Will await imaging and consider MRI if not improved after physical therapy   Reviewed risks and benefits of NSAIDs.     Patient Instructions   Schedule with physical. If pain worsening, or does not resolve after completion of recommended therapy, let me know and we can discuss getting an MRI    Start taking diclofenac twice daily. Do not take ibuprofen or other over the counter NSAIDs while taking this medication  Can also take tylenol in between doses          SARAH Hare [facility]

## 2017-07-18 ENCOUNTER — TELEPHONE (OUTPATIENT)
Dept: FAMILY MEDICINE | Facility: CLINIC | Age: 42
End: 2017-07-18

## 2017-07-18 DIAGNOSIS — E11.9 TYPE 2 DIABETES MELLITUS WITHOUT COMPLICATION, WITHOUT LONG-TERM CURRENT USE OF INSULIN (H): Primary | ICD-10-CM

## 2017-07-18 RX ORDER — ATORVASTATIN CALCIUM 10 MG/1
10 TABLET, FILM COATED ORAL DAILY
Qty: 90 TABLET | Refills: 3 | Status: SHIPPED | OUTPATIENT
Start: 2017-07-18

## 2017-07-18 NOTE — PROGRESS NOTES
Cortes,  Your lab results have been released to [facility].   I hope I didn't overwhelm you with all of the new medications and information regarding your diabetes. Our goal is to get your A1c to less then 6.5%. We can surely do this with the metformin and if you are able to gain additional control through diet and weight loss, we can always take you off of the metformin in the future. Start by taking 1 tablet daily (with meals) for 4 days then increase to 1 tablet twice daily once tolerated.   I also sent in a prescription for a baby aspirin which you should take daily. The aspirin and Lipitor will help to protect your heart and arteries and reduce your risk of heart disease and stroke.  Follow up in 3 months. We'll recheck labs and make sure you are tolerating the new medications. Follow up sooner if you have any concerns. You can also reach out to me on [facility] if you have any questions.   Irina Shine, CNP

## 2017-08-24 DIAGNOSIS — I10 ESSENTIAL HYPERTENSION: ICD-10-CM

## 2017-08-24 RX ORDER — LISINOPRIL/HYDROCHLOROTHIAZIDE 10-12.5 MG
TABLET ORAL
Qty: 30 TABLET | Refills: 0 | OUTPATIENT
Start: 2017-08-24

## 2017-08-26 DIAGNOSIS — I10 ESSENTIAL HYPERTENSION: ICD-10-CM

## 2017-08-29 RX ORDER — LISINOPRIL/HYDROCHLOROTHIAZIDE 10-12.5 MG
TABLET ORAL
Qty: 30 TABLET | Refills: 0 | OUTPATIENT
Start: 2017-08-29

## 2019-02-28 ENCOUNTER — APPOINTMENT (OUTPATIENT)
Dept: GENERAL RADIOLOGY | Facility: CLINIC | Age: 44
DRG: 291 | End: 2019-02-28
Attending: EMERGENCY MEDICINE
Payer: COMMERCIAL

## 2019-02-28 ENCOUNTER — HOSPITAL ENCOUNTER (INPATIENT)
Facility: CLINIC | Age: 44
LOS: 2 days | Discharge: HOME | DRG: 291 | End: 2019-03-02
Attending: EMERGENCY MEDICINE | Admitting: INTERNAL MEDICINE
Payer: COMMERCIAL

## 2019-02-28 DIAGNOSIS — E11.9 TYPE 2 DIABETES MELLITUS WITHOUT COMPLICATION, WITHOUT LONG-TERM CURRENT USE OF INSULIN (H): ICD-10-CM

## 2019-02-28 DIAGNOSIS — I10 ESSENTIAL HYPERTENSION: ICD-10-CM

## 2019-02-28 DIAGNOSIS — I50.21 ACUTE SYSTOLIC CONGESTIVE HEART FAILURE (H): ICD-10-CM

## 2019-02-28 PROBLEM — R06.02 SHORTNESS OF BREATH: Status: ACTIVE | Noted: 2019-02-28

## 2019-02-28 LAB
ALBUMIN SERPL-MCNC: 3.1 G/DL (ref 3.4–5)
ALP SERPL-CCNC: 81 U/L (ref 40–150)
ALT SERPL W P-5'-P-CCNC: 40 U/L (ref 0–70)
ANION GAP SERPL CALCULATED.3IONS-SCNC: 9 MMOL/L (ref 3–14)
APTT PPP: 29 SEC (ref 22–37)
AST SERPL W P-5'-P-CCNC: 28 U/L (ref 0–45)
BASOPHILS # BLD AUTO: 0 10E9/L (ref 0–0.2)
BASOPHILS NFR BLD AUTO: 0.3 %
BILIRUB SERPL-MCNC: 0.8 MG/DL (ref 0.2–1.3)
BUN SERPL-MCNC: 10 MG/DL (ref 7–30)
CALCIUM SERPL-MCNC: 8.7 MG/DL (ref 8.5–10.1)
CHLORIDE SERPL-SCNC: 102 MMOL/L (ref 94–109)
CO2 SERPL-SCNC: 27 MMOL/L (ref 20–32)
CREAT SERPL-MCNC: 0.87 MG/DL (ref 0.66–1.25)
D DIMER PPP FEU-MCNC: 0.5 UG/ML FEU (ref 0–0.5)
DIFFERENTIAL METHOD BLD: NORMAL
EOSINOPHIL # BLD AUTO: 0.1 10E9/L (ref 0–0.7)
EOSINOPHIL NFR BLD AUTO: 0.9 %
ERYTHROCYTE [DISTWIDTH] IN BLOOD BY AUTOMATED COUNT: 12.7 % (ref 10–15)
FLUAV+FLUBV AG SPEC QL: NEGATIVE
FLUAV+FLUBV AG SPEC QL: NEGATIVE
GFR SERPL CREATININE-BSD FRML MDRD: >90 ML/MIN/{1.73_M2}
GLUCOSE BLDC GLUCOMTR-MCNC: 367 MG/DL (ref 70–99)
GLUCOSE SERPL-MCNC: 294 MG/DL (ref 70–99)
HCT VFR BLD AUTO: 41.8 % (ref 40–53)
HGB BLD-MCNC: 14.6 G/DL (ref 13.3–17.7)
IMM GRANULOCYTES # BLD: 0 10E9/L (ref 0–0.4)
IMM GRANULOCYTES NFR BLD: 0.3 %
INR PPP: 1.01 (ref 0.86–1.14)
LYMPHOCYTES # BLD AUTO: 1.5 10E9/L (ref 0.8–5.3)
LYMPHOCYTES NFR BLD AUTO: 15.9 %
MCH RBC QN AUTO: 29.9 PG (ref 26.5–33)
MCHC RBC AUTO-ENTMCNC: 34.9 G/DL (ref 31.5–36.5)
MCV RBC AUTO: 86 FL (ref 78–100)
MONOCYTES # BLD AUTO: 0.8 10E9/L (ref 0–1.3)
MONOCYTES NFR BLD AUTO: 7.8 %
NEUTROPHILS # BLD AUTO: 7.1 10E9/L (ref 1.6–8.3)
NEUTROPHILS NFR BLD AUTO: 74.8 %
NRBC # BLD AUTO: 0 10*3/UL
NRBC BLD AUTO-RTO: 0 /100
NT-PROBNP SERPL-MCNC: 872 PG/ML (ref 0–450)
PLATELET # BLD AUTO: 250 10E9/L (ref 150–450)
POTASSIUM SERPL-SCNC: 4.3 MMOL/L (ref 3.4–5.3)
PROT SERPL-MCNC: 7.1 G/DL (ref 6.8–8.8)
RBC # BLD AUTO: 4.88 10E12/L (ref 4.4–5.9)
SODIUM SERPL-SCNC: 138 MMOL/L (ref 133–144)
SPECIMEN SOURCE: NORMAL
TROPONIN I SERPL-MCNC: 0.03 UG/L (ref 0–0.04)
WBC # BLD AUTO: 9.6 10E9/L (ref 4–11)

## 2019-02-28 PROCEDURE — 25000132 ZZH RX MED GY IP 250 OP 250 PS 637: Performed by: STUDENT IN AN ORGANIZED HEALTH CARE EDUCATION/TRAINING PROGRAM

## 2019-02-28 PROCEDURE — 96375 TX/PRO/DX INJ NEW DRUG ADDON: CPT | Performed by: EMERGENCY MEDICINE

## 2019-02-28 PROCEDURE — 85379 FIBRIN DEGRADATION QUANT: CPT | Performed by: EMERGENCY MEDICINE

## 2019-02-28 PROCEDURE — 25000128 H RX IP 250 OP 636: Performed by: EMERGENCY MEDICINE

## 2019-02-28 PROCEDURE — 99285 EMERGENCY DEPT VISIT HI MDM: CPT | Mod: 25 | Performed by: EMERGENCY MEDICINE

## 2019-02-28 PROCEDURE — 99291 CRITICAL CARE FIRST HOUR: CPT | Mod: 25 | Performed by: EMERGENCY MEDICINE

## 2019-02-28 PROCEDURE — 99223 1ST HOSP IP/OBS HIGH 75: CPT | Mod: AI | Performed by: INTERNAL MEDICINE

## 2019-02-28 PROCEDURE — 71046 X-RAY EXAM CHEST 2 VIEWS: CPT

## 2019-02-28 PROCEDURE — 96374 THER/PROPH/DIAG INJ IV PUSH: CPT | Performed by: EMERGENCY MEDICINE

## 2019-02-28 PROCEDURE — 00000146 ZZHCL STATISTIC GLUCOSE BY METER IP

## 2019-02-28 PROCEDURE — 83880 ASSAY OF NATRIURETIC PEPTIDE: CPT | Performed by: EMERGENCY MEDICINE

## 2019-02-28 PROCEDURE — 93005 ELECTROCARDIOGRAM TRACING: CPT | Performed by: EMERGENCY MEDICINE

## 2019-02-28 PROCEDURE — 84443 ASSAY THYROID STIM HORMONE: CPT | Performed by: PHYSICIAN ASSISTANT

## 2019-02-28 PROCEDURE — 84484 ASSAY OF TROPONIN QUANT: CPT | Performed by: PHYSICIAN ASSISTANT

## 2019-02-28 PROCEDURE — 21400000 ZZH R&B CCU UMMC

## 2019-02-28 PROCEDURE — 87804 INFLUENZA ASSAY W/OPTIC: CPT | Performed by: EMERGENCY MEDICINE

## 2019-02-28 PROCEDURE — 93010 ELECTROCARDIOGRAM REPORT: CPT | Mod: Z6 | Performed by: EMERGENCY MEDICINE

## 2019-02-28 PROCEDURE — 84484 ASSAY OF TROPONIN QUANT: CPT | Performed by: EMERGENCY MEDICINE

## 2019-02-28 PROCEDURE — 85025 COMPLETE CBC W/AUTO DIFF WBC: CPT | Performed by: EMERGENCY MEDICINE

## 2019-02-28 PROCEDURE — 36415 COLL VENOUS BLD VENIPUNCTURE: CPT | Performed by: PHYSICIAN ASSISTANT

## 2019-02-28 PROCEDURE — 80053 COMPREHEN METABOLIC PANEL: CPT | Performed by: EMERGENCY MEDICINE

## 2019-02-28 PROCEDURE — 25000132 ZZH RX MED GY IP 250 OP 250 PS 637: Performed by: INTERNAL MEDICINE

## 2019-02-28 PROCEDURE — 85730 THROMBOPLASTIN TIME PARTIAL: CPT | Performed by: EMERGENCY MEDICINE

## 2019-02-28 PROCEDURE — 85610 PROTHROMBIN TIME: CPT | Performed by: EMERGENCY MEDICINE

## 2019-02-28 PROCEDURE — 80061 LIPID PANEL: CPT | Performed by: PHYSICIAN ASSISTANT

## 2019-02-28 PROCEDURE — 25000132 ZZH RX MED GY IP 250 OP 250 PS 637: Performed by: EMERGENCY MEDICINE

## 2019-02-28 RX ORDER — ASPIRIN 81 MG/1
81 TABLET ORAL DAILY
Status: DISCONTINUED | OUTPATIENT
Start: 2019-03-01 | End: 2019-02-28

## 2019-02-28 RX ORDER — ACETAMINOPHEN 325 MG/1
650 TABLET ORAL EVERY 4 HOURS PRN
Status: DISCONTINUED | OUTPATIENT
Start: 2019-02-28 | End: 2019-03-02 | Stop reason: HOSPADM

## 2019-02-28 RX ORDER — HYDRALAZINE HYDROCHLORIDE 25 MG/1
25 TABLET, FILM COATED ORAL 4 TIMES DAILY
Status: DISCONTINUED | OUTPATIENT
Start: 2019-02-28 | End: 2019-02-28

## 2019-02-28 RX ORDER — NITROGLYCERIN 0.4 MG/1
0.4 TABLET SUBLINGUAL EVERY 5 MIN PRN
Status: DISCONTINUED | OUTPATIENT
Start: 2019-02-28 | End: 2019-03-02 | Stop reason: HOSPADM

## 2019-02-28 RX ORDER — HYDRALAZINE HYDROCHLORIDE 25 MG/1
25 TABLET, FILM COATED ORAL EVERY 8 HOURS SCHEDULED
Status: DISCONTINUED | OUTPATIENT
Start: 2019-02-28 | End: 2019-03-01

## 2019-02-28 RX ORDER — HYDROXYZINE HYDROCHLORIDE 25 MG/1
50 TABLET, FILM COATED ORAL EVERY 6 HOURS PRN
Status: DISCONTINUED | OUTPATIENT
Start: 2019-02-28 | End: 2019-02-28

## 2019-02-28 RX ORDER — HYDRALAZINE HYDROCHLORIDE 25 MG/1
25 TABLET, FILM COATED ORAL 4 TIMES DAILY
Status: DISCONTINUED | OUTPATIENT
Start: 2019-03-01 | End: 2019-02-28

## 2019-02-28 RX ORDER — AMOXICILLIN 250 MG
1 CAPSULE ORAL 2 TIMES DAILY PRN
Status: DISCONTINUED | OUTPATIENT
Start: 2019-02-28 | End: 2019-03-02 | Stop reason: HOSPADM

## 2019-02-28 RX ORDER — ASPIRIN 81 MG/1
81 TABLET, CHEWABLE ORAL DAILY
Status: DISCONTINUED | OUTPATIENT
Start: 2019-03-01 | End: 2019-03-02 | Stop reason: HOSPADM

## 2019-02-28 RX ORDER — HYDRALAZINE HYDROCHLORIDE 20 MG/ML
10 INJECTION INTRAMUSCULAR; INTRAVENOUS EVERY 6 HOURS PRN
Status: DISCONTINUED | OUTPATIENT
Start: 2019-02-28 | End: 2019-03-02 | Stop reason: HOSPADM

## 2019-02-28 RX ORDER — HYDROXYZINE HYDROCHLORIDE 25 MG/1
25 TABLET, FILM COATED ORAL EVERY 4 HOURS PRN
Status: DISCONTINUED | OUTPATIENT
Start: 2019-02-28 | End: 2019-03-02 | Stop reason: HOSPADM

## 2019-02-28 RX ORDER — LISINOPRIL AND HYDROCHLOROTHIAZIDE 20; 25 MG/1; MG/1
1 TABLET ORAL DAILY
Status: DISCONTINUED | OUTPATIENT
Start: 2019-02-28 | End: 2019-03-01

## 2019-02-28 RX ORDER — ALUMINA, MAGNESIA, AND SIMETHICONE 2400; 2400; 240 MG/30ML; MG/30ML; MG/30ML
30 SUSPENSION ORAL EVERY 4 HOURS PRN
Status: DISCONTINUED | OUTPATIENT
Start: 2019-02-28 | End: 2019-03-02 | Stop reason: HOSPADM

## 2019-02-28 RX ORDER — LABETALOL 20 MG/4 ML (5 MG/ML) INTRAVENOUS SYRINGE
10 ONCE
Status: COMPLETED | OUTPATIENT
Start: 2019-02-28 | End: 2019-02-28

## 2019-02-28 RX ORDER — LIDOCAINE 40 MG/G
CREAM TOPICAL
Status: DISCONTINUED | OUTPATIENT
Start: 2019-02-28 | End: 2019-03-02 | Stop reason: HOSPADM

## 2019-02-28 RX ORDER — ATORVASTATIN CALCIUM 10 MG/1
10 TABLET, FILM COATED ORAL DAILY
Status: DISCONTINUED | OUTPATIENT
Start: 2019-03-01 | End: 2019-03-02 | Stop reason: HOSPADM

## 2019-02-28 RX ORDER — HYDRALAZINE HYDROCHLORIDE 20 MG/ML
10 INJECTION INTRAMUSCULAR; INTRAVENOUS ONCE
Status: COMPLETED | OUTPATIENT
Start: 2019-02-28 | End: 2019-02-28

## 2019-02-28 RX ORDER — AMOXICILLIN 250 MG
2 CAPSULE ORAL 2 TIMES DAILY
Status: DISCONTINUED | OUTPATIENT
Start: 2019-02-28 | End: 2019-03-02 | Stop reason: HOSPADM

## 2019-02-28 RX ORDER — NITROGLYCERIN 20 MG/100ML
0.07-2 INJECTION INTRAVENOUS CONTINUOUS
Status: DISCONTINUED | OUTPATIENT
Start: 2019-02-28 | End: 2019-02-28

## 2019-02-28 RX ORDER — ONDANSETRON 4 MG/1
4 TABLET, ORALLY DISINTEGRATING ORAL EVERY 6 HOURS PRN
Status: DISCONTINUED | OUTPATIENT
Start: 2019-02-28 | End: 2019-03-02 | Stop reason: HOSPADM

## 2019-02-28 RX ORDER — AMOXICILLIN 250 MG
1 CAPSULE ORAL 2 TIMES DAILY
Status: DISCONTINUED | OUTPATIENT
Start: 2019-02-28 | End: 2019-03-02 | Stop reason: HOSPADM

## 2019-02-28 RX ORDER — ACETAMINOPHEN 650 MG/1
650 SUPPOSITORY RECTAL EVERY 4 HOURS PRN
Status: DISCONTINUED | OUTPATIENT
Start: 2019-02-28 | End: 2019-03-02 | Stop reason: HOSPADM

## 2019-02-28 RX ORDER — FUROSEMIDE 10 MG/ML
40 INJECTION INTRAMUSCULAR; INTRAVENOUS ONCE
Status: COMPLETED | OUTPATIENT
Start: 2019-02-28 | End: 2019-02-28

## 2019-02-28 RX ORDER — HYDROXYZINE HYDROCHLORIDE 25 MG/1
25 TABLET, FILM COATED ORAL EVERY 6 HOURS PRN
Status: DISCONTINUED | OUTPATIENT
Start: 2019-02-28 | End: 2019-02-28

## 2019-02-28 RX ORDER — ONDANSETRON 2 MG/ML
4 INJECTION INTRAMUSCULAR; INTRAVENOUS EVERY 6 HOURS PRN
Status: DISCONTINUED | OUTPATIENT
Start: 2019-02-28 | End: 2019-03-02 | Stop reason: HOSPADM

## 2019-02-28 RX ORDER — AMOXICILLIN 250 MG
2 CAPSULE ORAL 2 TIMES DAILY PRN
Status: DISCONTINUED | OUTPATIENT
Start: 2019-02-28 | End: 2019-03-02 | Stop reason: HOSPADM

## 2019-02-28 RX ADMIN — HYDRALAZINE HYDROCHLORIDE 10 MG: 20 INJECTION INTRAMUSCULAR; INTRAVENOUS at 16:41

## 2019-02-28 RX ADMIN — FUROSEMIDE 40 MG: 10 INJECTION, SOLUTION INTRAMUSCULAR; INTRAVENOUS at 14:45

## 2019-02-28 RX ADMIN — HYDRALAZINE HYDROCHLORIDE 25 MG: 25 TABLET, FILM COATED ORAL at 23:33

## 2019-02-28 RX ADMIN — LISINOPRIL AND HYDROCHLOROTHIAZIDE 1 TABLET: 25; 20 TABLET ORAL at 13:13

## 2019-02-28 RX ADMIN — LABETALOL 20 MG/4 ML (5 MG/ML) INTRAVENOUS SYRINGE 10 MG: at 15:51

## 2019-02-28 RX ADMIN — HYDROXYZINE HYDROCHLORIDE 25 MG: 25 TABLET ORAL at 23:33

## 2019-02-28 ASSESSMENT — ENCOUNTER SYMPTOMS
VOMITING: 0
NAUSEA: 0
PALPITATIONS: 0
CHILLS: 0
HEADACHES: 0
FEVER: 0
COUGH: 0
SHORTNESS OF BREATH: 1

## 2019-02-28 ASSESSMENT — MIFFLIN-ST. JEOR
SCORE: 1799.17
SCORE: 1729.54

## 2019-02-28 NOTE — ED TRIAGE NOTES
SOB started Monday. Just cannot seem to catch breath. Patient reports he has not taken medications for HTN, DM or hyperlipidemia for about 8 months.

## 2019-02-28 NOTE — ED NOTES
Patient's oxygen saturations dipped down into 80's on room air. Applied O2 via nasal canula at 3 LPM. O2 sats increased to 93%.

## 2019-02-28 NOTE — ED NOTES
Sign out Provider: Kelly   Sign out Plan: Patient is a 43-year-old male who presented with shortness of breath and currently awaiting admission for CHF and hypertensive urgency    Reassessment: Patient's blood pressure remains significantly elevated following Lasix, labetalol and prinzide/zestoretic in the 200s/150s.  Patient was given 10 mg IV hydralazine with improvement in his blood pressures to 160s over 100s.  He reports that he does feel his breathing is significantly improved.  He has had adequate diuresis following Lasix.  Currently awaiting bed placement and inpatient cardiology.    Disposition: Admit to cardiology           OHannah Holly MD  02/28/19 [id number]

## 2019-02-28 NOTE — ED PROVIDER NOTES
[facility] EMERGENCY DEPARTMENT ([facility])    2/28/19       History     Chief Complaint   Patient presents with     Shortness of Breath     SOB started Monday. Just cannot seem to catch breath.      The history is provided by the patient.     Eliseo Sims is a 43 year old male with a medical history significant for type 2 diabetes mellitus, hypertension and hyperlipidemia who presents to the Emergency Department from urgent care for evaluation of shortness of breath with concern for possible PE.  The patient reports that he began having shortness of breath on 2/25/2019.  The patient states that he woke up with the shortness of breath, but he thought that it was secondary to an anxiety attack that he was having.  He reports that he has been more stressed recently.  The patient reports that he was feeling at his baseline of health the next day on 2/26/2019.  However, yesterday and today his shortness of breath has returned and has been constant.  He states that the shortness of breath is worse with exertion and better with sitting still.  He notes some diaphoresis today, but otherwise denies any chest pain, palpitations, cough, fevers, chills, headaches, nausea or vomiting.  He is noted to have some left leg swelling, but he denies any leg pain.  The patient does report that he has been uncompliant with his diabetes mellitus medications and his blood pressure medications for the past 8 months.  He reports that he initially was forgetting to take them, but then he entirely gave up on taking them.  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  He reports having 10 drinks yesterday.    I have reviewed the Medications, Allergies, Past Medical and Surgical History, and Social History in the [employer] system.    Past Medical History:   Diagnosis Date     Essential hypertension      Hyperlipidemia LDL goal <100      Type 2 diabetes mellitus without complication (H)        Past  Surgical History:   Procedure Laterality Date     APPENDECTOMY      age 15        Family History   Problem Relation Age of Onset     Family History Negative Father      Arthritis Mother        Social History     Tobacco Use     Smoking status: Former Smoker     Packs/day: 1.50     Years: 16.00     Pack years: 24.00     Types: Cigarettes     Last attempt to quit: 2013     Years since quittin.8     Smokeless tobacco: Never Used   Substance Use Topics     Alcohol use: Yes     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        Current Facility-Administered Medications   Medication     acetaminophen (TYLENOL) Suppository 650 mg     acetaminophen (TYLENOL) tablet 650 mg     alum & mag hydroxide-simethicone (MYLANTA ES/MAALOX  ES) suspension 30 mL     aspirin (ASA) chewable tablet 81 mg     atorvastatin (LIPITOR) tablet 10 mg     glucose gel 15-30 g    Or     dextrose 50 % injection 25-50 mL    Or     glucagon injection 1 mg     hydrALAZINE (APRESOLINE) injection 10 mg     hydrALAZINE (APRESOLINE) tablet 25 mg     hydrOXYzine (ATARAX) tablet 25 mg     insulin aspart (NovoLOG) inj (RAPID ACTING)     insulin aspart (NovoLOG) inj (RAPID ACTING)     lidocaine (LMX4) cream     lidocaine 1 % 0.1-1 mL     lisinopril-hydrochlorothiazide (PRINZIDE/ZESTORETIC) 20-25 MG per tablet 1 tablet     magnesium hydroxide (MILK OF MAGNESIA) suspension 30 mL     magnesium sulfate 2 g in water intermittent infusion     magnesium sulfate 4 g in 100 mL sterile water (premade)     medication instruction     melatonin tablet 1 mg     metFORMIN (GLUCOPHAGE) tablet 1,000 mg     nicotine (NICODERM CQ) 14 MG/24HR 24 hr patch 1 patch     nicotine Patch in Place     [START ON 3/2/2019] nicotine patch REMOVAL     nitroGLYcerin (NITROSTAT) sublingual tablet 0.4 mg     ondansetron (ZOFRAN-ODT) ODT tab 4 mg    Or     ondansetron (ZOFRAN) injection 4 mg     potassium chloride (KLOR-CON) Packet 20-40 mEq     potassium chloride 10 mEq in 100 mL  "intermittent infusion with 10 mg lidocaine     potassium chloride 10 mEq in 100 mL sterile water intermittent infusion (premix)     potassium chloride 20 mEq in 50 mL intermittent infusion     potassium chloride ER (K-DUR/KLOR-CON M) CR tablet 20-40 mEq     potassium phosphate 10 mmol in D5W 250 mL intermittent infusion     potassium phosphate 15 mmol in D5W 250 mL intermittent infusion     potassium phosphate 20 mmol in D5W 250 mL intermittent infusion     potassium phosphate 20 mmol in D5W 500 mL intermittent infusion     potassium phosphate 25 mmol in D5W 500 mL intermittent infusion     senna-docusate (SENOKOT-S/PERICOLACE) 8.6-50 MG per tablet 1 tablet    Or     senna-docusate (SENOKOT-S/PERICOLACE) 8.6-50 MG per tablet 2 tablet     senna-docusate (SENOKOT-S/PERICOLACE) 8.6-50 MG per tablet 1 tablet    Or     senna-docusate (SENOKOT-S/PERICOLACE) 8.6-50 MG per tablet 2 tablet     sodium chloride (PF) 0.9% PF flush 3 mL     sodium chloride (PF) 0.9% PF flush 3 mL        Allergies   Allergen Reactions     Penicillins          Review of Systems   Constitutional: Negative for chills and fever.   Respiratory: Positive for shortness of breath (worse with exertion). Negative for cough.    Cardiovascular: Positive for leg swelling (left). Negative for chest pain and palpitations.   Gastrointestinal: Negative for nausea and vomiting.   Neurological: Negative for headaches.   All other systems reviewed and are negative.      Physical Exam   BP: (!) 195/120  Heart Rate: 100  Resp: 20  Height: 168.9 cm (5' 6.5\")  Weight: 94.3 kg (208 lb)  SpO2: 95 %      Physical Exam   Constitutional: No distress.   HENT:   Head: Atraumatic.   Mouth/Throat: Oropharynx is clear and moist. No oropharyngeal exudate.   Eyes: Pupils are equal, round, and reactive to light. No scleral icterus.   Cardiovascular: Normal heart sounds.   Pulmonary/Chest:   Mild respiratory distress with bilateral basilar crackles   Abdominal: Soft. He exhibits no " distension. There is no tenderness.   Musculoskeletal: He exhibits no edema or tenderness.   Neurological: He is alert.   Skin: Skin is warm. He is not diaphoretic.   Psychiatric: He has a normal mood and affect. His behavior is normal.       ED Course        Procedures             EKG Interpretation:      Interpreted by Alek Mendoza  Time reviewed: 1300  Symptoms at time of EKG: Dyspnea rhythm: normal sinus   Rate: normal  Axis: normal  Ectopy: none  Conduction: normal  ST Segments/ T Waves: No ST-T wave elevation or depression, T wave inversion V5 V6  Q Waves: none  Comparison to prior: No old EKG available    Clinical Impression: Normal sinus rhythm without acute evidence of ischemia          Critical Care time:  was 40 minutes for this patient excluding procedures.  Critical care time included administration of antihypertensives, EKG interpretation, chest interpretation, review of patient's medical records, management of pulmonary edema, consultation with cardiology.    Labs Ordered and Resulted from Time of ED Arrival Up to the Time of Departure from the ED   GLUCOSE BY METER - Abnormal; Notable for the following components:       Result Value    Glucose 367 (*)     All other components within normal limits   NT PROBNP INPATIENT - Abnormal; Notable for the following components:    N-Terminal Pro BNP Inpatient 872 (*)     All other components within normal limits   COMPREHENSIVE METABOLIC PANEL - Abnormal; Notable for the following components:    Glucose 294 (*)     Albumin 3.1 (*)     All other components within normal limits   GLUCOSE MONITOR NURSING POCT   CBC WITH PLATELETS DIFFERENTIAL   TROPONIN I   D DIMER QUANTITATIVE   INR   PARTIAL THROMBOPLASTIN TIME   PERIPHERAL IV CATHETER   INFLUENZA A/B ANTIGEN            Assessments & Plan (with Medical Decision Making)   43-year-old male presents to us with a chief complaint of dyspnea predominantly on ambulation.  Differential includes but not limited to  coronary disease, pneumonia, bronchitis, heart failure, pulmonary embolism.  Patient was found to be profoundly hypertensive 200s over 100s on arrival.  He had previously been prescribed hypertensive medication was not taken in the last year in addition to not taking his diabetic medications.  Initial management included his old oral regimen of hydrochlorothiazide lisinopril.  This did not help with his symptoms.  He subsequently received IV labetalol and IV hydralazine that did help with his symptoms.  IV Lasix given for what appeared to be pulmonary edema also helped with the symptoms.  Patient had good urine output in the emergency department.  Patient's EKG was significant for T wave abnormalities in V5 V6.  There is no evidence of acute ischemia.  He had a slight elevation in his troponin of 0.02 likely related to what appears to be new onset heart failure.  I feel this is most likely a result of his untreated hypertension.  Chest x-ray showed a possible infiltrate in the right upper lung.  I feel this most likely related to his current pulmonary edema as he does not have an elevation in white count, fever, productive cough.  Patient had a normal d-dimer and given the other findings I am not concerned about PE as the cause of his symptoms at this time.  Patient's care was discussed with cardiology service who accepted admission.    I have reviewed the nursing notes.    I have reviewed the findings, diagnosis, plan and need for follow up with the patient.       Medication List      There are no discharge medications for this visit.         Final diagnoses:   Acute systolic congestive heart failure (H)     Additional diagnosis:   New onset congestive heart failure   Hypertensive urgency  IRuddy, am serving as a trained medical scribe to document services personally performed by Alek Mendoza DO, based on the provider's statements to me.   I, Alek Mendoza DO, was physically present and have  reviewed and verified the accuracy of this note documented by Ruddy Bello.    2/28/2019   [facility], [place], EMERGENCY DEPARTMENT     Alek Mendoza,   03/01/19 0751

## 2019-02-28 NOTE — PHARMACY-ADMISSION MEDICATION HISTORY
Admission medication history interview status for the 2/28/2019 admission is complete. See Epic admission navigator for allergy information, pharmacy, prior to admission medications and immunization status.     Medication history interview sources:  patient     Changes made to PTA medication list (reason)  Added: none   Deleted: none  Changed: none    Additional medication history information (including reliability of information, actions taken by pharmacist): Patient has not been compliant.  He stopped taking all of this medications about one year ago.       Prior to Admission medications    Medication Sig Last Dose Taking? Auth Provider   acetaminophen (TYLENOL) 325 MG tablet Take 2 tablets (650 mg) by mouth every 4 hours as needed for mild pain Unknown at Unknown time  Irina Shine APRN CNP   aspirin 81 MG tablet Take 1 tablet (81 mg) by mouth daily More than a month at Unknown time  Irina Shine APRN CNP   atorvastatin (LIPITOR) 10 MG tablet Take 1 tablet (10 mg) by mouth daily More than a month at Unknown time  Irina Shine APRN CNP   BL LANCETS MISC 1 Device 2 times daily.  Patient not taking: Reported on 7/7/2017 More than a month at Unknown time  Wegener, Joel Daniel Irwin, MD   Blood Glucose Monitoring Suppl (BLOOD GLUCOSE METER) KIT 1 Device 2 times daily.  Patient not taking: Reported on 7/7/2017 More than a month at Unknown time  Wegener, Joel Daniel Irwin, MD   cyclobenzaprine (FLEXERIL) 10 MG tablet Take 0.5-1 tablets (5-10 mg) by mouth 2 times daily as needed for muscle spasms More than a month at Unknown time  Tiffanie Arvizu MD   diclofenac (VOLTAREN) 75 MG EC tablet Take 1 tablet (75 mg) by mouth 2 times daily as needed for moderate pain More than a month at Unknown time  Irina Shine APRN CNP   glucose blood VI test strips (ONE TOUCH ULTRA TEST) strip Use to test blood sugars two times daily or as directed.  Patient not taking: Reported on 7/7/2017 More  than a month at Unknown time  Wegener, Joel Daniel Irwin, MD   lisinopril-hydrochlorothiazide (PRINZIDE/ZESTORETIC) 20-25 MG per tablet Take 1 tablet by mouth daily More than a month at Unknown time  Irina Shine APRN CNP   metFORMIN (GLUCOPHAGE) 1000 MG tablet Take 1 tablet (1,000 mg) by mouth 2 times daily (with meals) More than a month at Unknown time  Irina Shine APRN CNP   NEW MED 12mg. Dose E cigs.  More than a month at Unknown time  Reported, Patient   UNABLE TO FIND Please give pt. Either brand name Roche or Nipro Diabetic Kit.( Per his insurance.)  Also fill Test stripes:  He test up to 3 times a day. (per his insurance)  Lancets:  Up to 3 times a day (per his insurance)  Patient not taking: Reported on 7/7/2017 More than a month at Unknown time  Wegener, Joel Daniel Irwin, MD         Medication history completed by: Teresa Silva, BolaD

## 2019-03-01 ENCOUNTER — APPOINTMENT (OUTPATIENT)
Dept: CARDIOLOGY | Facility: CLINIC | Age: 44
DRG: 291 | End: 2019-03-01
Attending: INTERNAL MEDICINE
Payer: COMMERCIAL

## 2019-03-01 LAB
ANION GAP SERPL CALCULATED.3IONS-SCNC: 12 MMOL/L (ref 3–14)
BUN SERPL-MCNC: 12 MG/DL (ref 7–30)
CALCIUM SERPL-MCNC: 8.6 MG/DL (ref 8.5–10.1)
CHLORIDE SERPL-SCNC: 100 MMOL/L (ref 94–109)
CHOLEST SERPL-MCNC: 146 MG/DL
CO2 SERPL-SCNC: 26 MMOL/L (ref 20–32)
CREAT SERPL-MCNC: 1.11 MG/DL (ref 0.66–1.25)
ERYTHROCYTE [DISTWIDTH] IN BLOOD BY AUTOMATED COUNT: 13.1 % (ref 10–15)
GFR SERPL CREATININE-BSD FRML MDRD: 82 ML/MIN/{1.73_M2}
GLUCOSE BLDC GLUCOMTR-MCNC: 191 MG/DL (ref 70–99)
GLUCOSE BLDC GLUCOMTR-MCNC: 217 MG/DL (ref 70–99)
GLUCOSE BLDC GLUCOMTR-MCNC: 221 MG/DL (ref 70–99)
GLUCOSE BLDC GLUCOMTR-MCNC: 242 MG/DL (ref 70–99)
GLUCOSE BLDC GLUCOMTR-MCNC: 291 MG/DL (ref 70–99)
GLUCOSE SERPL-MCNC: 224 MG/DL (ref 70–99)
HBA1C MFR BLD: 10.1 % (ref 0–5.6)
HCT VFR BLD AUTO: 39.6 % (ref 40–53)
HDLC SERPL-MCNC: 50 MG/DL
HGB BLD-MCNC: 13.6 G/DL (ref 13.3–17.7)
INTERPRETATION ECG - MUSE: NORMAL
INTERPRETATION ECG - MUSE: NORMAL
LDLC SERPL CALC-MCNC: 72 MG/DL
MAGNESIUM SERPL-MCNC: 1.9 MG/DL (ref 1.6–2.3)
MCH RBC QN AUTO: 29.5 PG (ref 26.5–33)
MCHC RBC AUTO-ENTMCNC: 34.3 G/DL (ref 31.5–36.5)
MCV RBC AUTO: 86 FL (ref 78–100)
NONHDLC SERPL-MCNC: 96 MG/DL
PLATELET # BLD AUTO: 278 10E9/L (ref 150–450)
POTASSIUM SERPL-SCNC: 3 MMOL/L (ref 3.4–5.3)
POTASSIUM SERPL-SCNC: 4 MMOL/L (ref 3.4–5.3)
RBC # BLD AUTO: 4.61 10E12/L (ref 4.4–5.9)
SODIUM SERPL-SCNC: 139 MMOL/L (ref 133–144)
TRIGL SERPL-MCNC: 117 MG/DL
TROPONIN I SERPL-MCNC: 0.02 UG/L (ref 0–0.04)
TROPONIN I SERPL-MCNC: 0.02 UG/L (ref 0–0.04)
TROPONIN I SERPL-MCNC: 0.03 UG/L (ref 0–0.04)
TSH SERPL DL<=0.005 MIU/L-ACNC: 2.82 MU/L (ref 0.4–4)
WBC # BLD AUTO: 8.9 10E9/L (ref 4–11)

## 2019-03-01 PROCEDURE — 85027 COMPLETE CBC AUTOMATED: CPT | Performed by: PHYSICIAN ASSISTANT

## 2019-03-01 PROCEDURE — 84484 ASSAY OF TROPONIN QUANT: CPT | Performed by: PHYSICIAN ASSISTANT

## 2019-03-01 PROCEDURE — 25000132 ZZH RX MED GY IP 250 OP 250 PS 637: Performed by: PHYSICIAN ASSISTANT

## 2019-03-01 PROCEDURE — 25000128 H RX IP 250 OP 636: Performed by: STUDENT IN AN ORGANIZED HEALTH CARE EDUCATION/TRAINING PROGRAM

## 2019-03-01 PROCEDURE — 00000146 ZZHCL STATISTIC GLUCOSE BY METER IP

## 2019-03-01 PROCEDURE — 84132 ASSAY OF SERUM POTASSIUM: CPT | Performed by: INTERNAL MEDICINE

## 2019-03-01 PROCEDURE — 25000132 ZZH RX MED GY IP 250 OP 250 PS 637: Performed by: INTERNAL MEDICINE

## 2019-03-01 PROCEDURE — 99232 SBSQ HOSP IP/OBS MODERATE 35: CPT | Mod: GC | Performed by: INTERNAL MEDICINE

## 2019-03-01 PROCEDURE — 93306 TTE W/DOPPLER COMPLETE: CPT | Mod: 26 | Performed by: INTERNAL MEDICINE

## 2019-03-01 PROCEDURE — 93306 TTE W/DOPPLER COMPLETE: CPT

## 2019-03-01 PROCEDURE — 83735 ASSAY OF MAGNESIUM: CPT | Performed by: INTERNAL MEDICINE

## 2019-03-01 PROCEDURE — 21400000 ZZH R&B CCU UMMC

## 2019-03-01 PROCEDURE — 36415 COLL VENOUS BLD VENIPUNCTURE: CPT | Performed by: PHYSICIAN ASSISTANT

## 2019-03-01 PROCEDURE — 83036 HEMOGLOBIN GLYCOSYLATED A1C: CPT | Performed by: STUDENT IN AN ORGANIZED HEALTH CARE EDUCATION/TRAINING PROGRAM

## 2019-03-01 PROCEDURE — 83735 ASSAY OF MAGNESIUM: CPT | Performed by: PHYSICIAN ASSISTANT

## 2019-03-01 PROCEDURE — 80048 BASIC METABOLIC PNL TOTAL CA: CPT | Performed by: PHYSICIAN ASSISTANT

## 2019-03-01 PROCEDURE — 25000131 ZZH RX MED GY IP 250 OP 636 PS 637: Performed by: PHYSICIAN ASSISTANT

## 2019-03-01 PROCEDURE — 36415 COLL VENOUS BLD VENIPUNCTURE: CPT | Performed by: INTERNAL MEDICINE

## 2019-03-01 PROCEDURE — 25000132 ZZH RX MED GY IP 250 OP 250 PS 637: Performed by: EMERGENCY MEDICINE

## 2019-03-01 PROCEDURE — 25000132 ZZH RX MED GY IP 250 OP 250 PS 637: Performed by: STUDENT IN AN ORGANIZED HEALTH CARE EDUCATION/TRAINING PROGRAM

## 2019-03-01 PROCEDURE — 25000131 ZZH RX MED GY IP 250 OP 636 PS 637: Performed by: STUDENT IN AN ORGANIZED HEALTH CARE EDUCATION/TRAINING PROGRAM

## 2019-03-01 RX ORDER — SPIRONOLACTONE 25 MG/1
25 TABLET ORAL ONCE
Status: COMPLETED | OUTPATIENT
Start: 2019-03-01 | End: 2019-03-01

## 2019-03-01 RX ORDER — POTASSIUM CL/LIDO/0.9 % NACL 10MEQ/0.1L
10 INTRAVENOUS SOLUTION, PIGGYBACK (ML) INTRAVENOUS
Status: DISCONTINUED | OUTPATIENT
Start: 2019-03-01 | End: 2019-03-02 | Stop reason: HOSPADM

## 2019-03-01 RX ORDER — MAGNESIUM SULFATE HEPTAHYDRATE 40 MG/ML
4 INJECTION, SOLUTION INTRAVENOUS EVERY 4 HOURS PRN
Status: DISCONTINUED | OUTPATIENT
Start: 2019-03-01 | End: 2019-03-02 | Stop reason: HOSPADM

## 2019-03-01 RX ORDER — AMLODIPINE BESYLATE 2.5 MG/1
2.5 TABLET ORAL DAILY
Status: DISCONTINUED | OUTPATIENT
Start: 2019-03-01 | End: 2019-03-02 | Stop reason: HOSPADM

## 2019-03-01 RX ORDER — AMLODIPINE BESYLATE 5 MG/1
5 TABLET ORAL DAILY
Status: DISCONTINUED | OUTPATIENT
Start: 2019-03-01 | End: 2019-03-01

## 2019-03-01 RX ORDER — POTASSIUM CHLORIDE 1.5 G/1.58G
20-40 POWDER, FOR SOLUTION ORAL
Status: DISCONTINUED | OUTPATIENT
Start: 2019-03-01 | End: 2019-03-02 | Stop reason: HOSPADM

## 2019-03-01 RX ORDER — MAGNESIUM SULFATE HEPTAHYDRATE 40 MG/ML
2 INJECTION, SOLUTION INTRAVENOUS DAILY PRN
Status: DISCONTINUED | OUTPATIENT
Start: 2019-03-01 | End: 2019-03-02 | Stop reason: HOSPADM

## 2019-03-01 RX ORDER — NICOTINE POLACRILEX 4 MG
15-30 LOZENGE BUCCAL
Status: DISCONTINUED | OUTPATIENT
Start: 2019-03-01 | End: 2019-03-02 | Stop reason: HOSPADM

## 2019-03-01 RX ORDER — LISINOPRIL 20 MG/1
40 TABLET ORAL DAILY
Status: DISCONTINUED | OUTPATIENT
Start: 2019-03-02 | End: 2019-03-02 | Stop reason: HOSPADM

## 2019-03-01 RX ORDER — SPIRONOLACTONE AND HYDROCHLOROTHIAZIDE 25; 25 MG/1; MG/1
1 TABLET ORAL DAILY
Status: DISCONTINUED | OUTPATIENT
Start: 2019-03-01 | End: 2019-03-01

## 2019-03-01 RX ORDER — LISINOPRIL 20 MG/1
40 TABLET ORAL DAILY
Status: DISCONTINUED | OUTPATIENT
Start: 2019-03-01 | End: 2019-03-01

## 2019-03-01 RX ORDER — POTASSIUM CHLORIDE 7.45 MG/ML
10 INJECTION INTRAVENOUS
Status: DISCONTINUED | OUTPATIENT
Start: 2019-03-01 | End: 2019-03-02 | Stop reason: HOSPADM

## 2019-03-01 RX ORDER — AMLODIPINE BESYLATE 5 MG/1
5 TABLET ORAL DAILY
Status: DISCONTINUED | OUTPATIENT
Start: 2019-03-02 | End: 2019-03-01

## 2019-03-01 RX ORDER — DEXTROSE MONOHYDRATE 25 G/50ML
25-50 INJECTION, SOLUTION INTRAVENOUS
Status: DISCONTINUED | OUTPATIENT
Start: 2019-03-01 | End: 2019-03-02 | Stop reason: HOSPADM

## 2019-03-01 RX ORDER — HYDRALAZINE HYDROCHLORIDE 10 MG/1
10 TABLET, FILM COATED ORAL EVERY 8 HOURS SCHEDULED
Status: DISCONTINUED | OUTPATIENT
Start: 2019-03-01 | End: 2019-03-01

## 2019-03-01 RX ORDER — POTASSIUM CHLORIDE 29.8 MG/ML
20 INJECTION INTRAVENOUS
Status: DISCONTINUED | OUTPATIENT
Start: 2019-03-01 | End: 2019-03-02 | Stop reason: HOSPADM

## 2019-03-01 RX ORDER — POTASSIUM CHLORIDE 750 MG/1
20-40 TABLET, EXTENDED RELEASE ORAL
Status: DISCONTINUED | OUTPATIENT
Start: 2019-03-01 | End: 2019-03-02 | Stop reason: HOSPADM

## 2019-03-01 RX ORDER — SPIRONOLACTONE AND HYDROCHLOROTHIAZIDE 25; 25 MG/1; MG/1
1 TABLET ORAL DAILY
Status: DISCONTINUED | OUTPATIENT
Start: 2019-03-02 | End: 2019-03-02 | Stop reason: HOSPADM

## 2019-03-01 RX ORDER — AMLODIPINE BESYLATE 2.5 MG/1
2.5 TABLET ORAL DAILY
Status: DISCONTINUED | OUTPATIENT
Start: 2019-03-02 | End: 2019-03-01

## 2019-03-01 RX ORDER — LISINOPRIL 20 MG/1
20 TABLET ORAL ONCE
Status: COMPLETED | OUTPATIENT
Start: 2019-03-01 | End: 2019-03-01

## 2019-03-01 RX ORDER — AMLODIPINE BESYLATE 10 MG/1
10 TABLET ORAL DAILY
Status: DISCONTINUED | OUTPATIENT
Start: 2019-03-02 | End: 2019-03-01

## 2019-03-01 RX ORDER — NICOTINE 21 MG/24HR
1 PATCH, TRANSDERMAL 24 HOURS TRANSDERMAL DAILY
Status: DISCONTINUED | OUTPATIENT
Start: 2019-03-01 | End: 2019-03-02 | Stop reason: HOSPADM

## 2019-03-01 RX ORDER — SPIRONOLACTONE 25 MG/1
25 TABLET ORAL DAILY
Status: DISCONTINUED | OUTPATIENT
Start: 2019-03-01 | End: 2019-03-01

## 2019-03-01 RX ADMIN — POTASSIUM CHLORIDE 40 MEQ: 750 TABLET, EXTENDED RELEASE ORAL at 06:41

## 2019-03-01 RX ADMIN — LISINOPRIL AND HYDROCHLOROTHIAZIDE 1 TABLET: 25; 20 TABLET ORAL at 08:45

## 2019-03-01 RX ADMIN — POTASSIUM CHLORIDE 20 MEQ: 750 TABLET, EXTENDED RELEASE ORAL at 10:03

## 2019-03-01 RX ADMIN — POTASSIUM CHLORIDE 20 MEQ: 750 TABLET, EXTENDED RELEASE ORAL at 18:55

## 2019-03-01 RX ADMIN — AMLODIPINE BESYLATE 2.5 MG: 2.5 TABLET ORAL at 21:33

## 2019-03-01 RX ADMIN — METFORMIN HYDROCHLORIDE 500 MG: 500 TABLET, FILM COATED ORAL at 18:58

## 2019-03-01 RX ADMIN — NICOTINE 1 PATCH: 14 PATCH, EXTENDED RELEASE TRANSDERMAL at 00:46

## 2019-03-01 RX ADMIN — MAGNESIUM SULFATE HEPTAHYDRATE 2 G: 40 INJECTION, SOLUTION INTRAVENOUS at 11:00

## 2019-03-01 RX ADMIN — SPIRONOLACTONE 25 MG: 25 TABLET ORAL at 10:03

## 2019-03-01 RX ADMIN — ATORVASTATIN CALCIUM 10 MG: 10 TABLET, FILM COATED ORAL at 08:44

## 2019-03-01 RX ADMIN — AMLODIPINE BESYLATE 5 MG: 5 TABLET ORAL at 08:45

## 2019-03-01 RX ADMIN — HYDRALAZINE HYDROCHLORIDE 25 MG: 25 TABLET, FILM COATED ORAL at 06:28

## 2019-03-01 RX ADMIN — ASPIRIN 81 MG CHEWABLE TABLET 81 MG: 81 TABLET CHEWABLE at 08:45

## 2019-03-01 RX ADMIN — LISINOPRIL 20 MG: 20 TABLET ORAL at 10:03

## 2019-03-01 RX ADMIN — METFORMIN HYDROCHLORIDE 1000 MG: 500 TABLET, FILM COATED ORAL at 08:45

## 2019-03-01 RX ADMIN — ONDANSETRON 4 MG: 4 TABLET, ORALLY DISINTEGRATING ORAL at 06:41

## 2019-03-01 RX ADMIN — INSULIN ASPART 1 UNITS: 100 INJECTION, SOLUTION INTRAVENOUS; SUBCUTANEOUS at 08:47

## 2019-03-01 ASSESSMENT — ACTIVITIES OF DAILY LIVING (ADL)
ADLS_ACUITY_SCORE: 11
ADLS_ACUITY_SCORE: 13
ADLS_ACUITY_SCORE: 11

## 2019-03-01 ASSESSMENT — MIFFLIN-ST. JEOR: SCORE: 1720.92

## 2019-03-01 NOTE — PROGRESS NOTES
"CLINICAL NUTRITION SERVICES    Reason for Assessment:  Received nutrition consult for \"diabetic/ consistent carb diet.\"    Diet History:  Pt reports he has monitored his carbohydrate intake in the past. He has done a low carbohydrate diet in the past but states he knows this is not currently recommended. Generally, he skips breakfast, eats lunch (sometimes [employer]), and then his wife makes supper. He will occasionally snack and prefers chips and salsa over having sweets, such as chocolate. Has questions about his sodium restriction (2 g/day). Agreeable to nutrition education on consistent carbohydrate diet and low-sodium diet.     DM II hx. Hgb A1c of 10.1 on 3/1/19. Was not taking meds for diabetes PTA. Admitted with hypertensive emergency.     Nutrition Diagnosis:  Food- and nutrition-related knowledge deficit r/t knowledge limitations of consistent carbohydrate diet and low sodium diet AEB no formal nutrition education previously on consistent carbohydrate diet and low sodium diet in the past.      Nutrition Prescription/Recs:  Combination diet of low-sodium and high consistent carbohydrate diet    Interventions:  Nutrition Education  1. Provided verbal and written nutrition on diabetes meal planning and importance of consistent carbohydrate intake to optimize glycemic control. Reviewed foods that contain carbohydrate and those that contain very little/no carbohydrate. Discussed portion sizes and gave some examples of food choices that have the same amount of carbohydrate. Rec ~60 g carbohydrate at meals, ideally three meals per day and less than or equal to 15 g carbohydrate at snacks. Provided verbal instruction on a heart-healthy diet with emphasis on low-sodium meal planning. Pt seems to be motivated to make some changes. Anticipate fair to good compliance and understanding of nutrition education today.   2. Handouts provided: Carb Counting handout, carb counting book, and Living with Diabetes handout - Pt " realizes he is not carb counting. How to Read Nutrition Labels, Low-Sodium Foods and Drinks, Tips for a Low-Sodium Diet, Seasoning Your Foods Without Adding Salt, and Managing Fluid Restriction.     Goals:   1. Patient will verbalize two food choices that contain very little/no carbohydrate.  2. Patient will verbalize the importance of consistent carbohydrate meal planning.     3. Pt will verbalize at least five high sodium foods and the importance of avoiding added salt to foods for cooking or seasoning foods.     Follow-up:    Patient to ask any further nutrition-related questions before discharge.  In addition, pt may request outpatient RD appointment.    Stacia Whalen, MS, RD, LD, [facility]   6C Pgr: [phone]

## 2019-03-01 NOTE — H&P
Cardiology History and Physical    Patient Name: Eliseo Sims MRN# [id number]   Age: 43 year old YOB: 1975     Date of Admission:2/28/2019  Primary care provider: No Ref-Primary, Physician  Date of Service: 2/28/2019         Assessment and Plan:   Eliseo Sims is a 43 year old male with a h/o HTN, HLD, and DM II who initially presented from urgent care to the ED for worsening shortness of breath over the past week, initial concern for PE, but was found to have flash pulmonary edema 2/2 to hypertensive urgency.    # Hypertensive urgency c/b flash pulmonary edema  Patient has been noncompliant with PTA medications which includes Prinzide. He could not state a particular reason why he was not taking it consistently. Has also endorsed alcohol use, but was not forthcoming on how much he drinks on a weekly basis. No known prior history of heart failure or lung disease. Initial concern for PE from urgent care, though D-dimer negative, troponin negative. Will need to evaluate for new heart failure.  - s/p lasix 40 mg IV, hydralazine 10 mg IV, labetalol 10 mg IV in ED  - restarted PTA Prinzide 20-25 mg  - goal SBP < 160/100  - TTE in AM  - counseled on reducing EtOH consumption  - will need f/u with PCP    # DM II  Last A1c 6.7 in 7/2017.  - continue PTA metformin 1000 mg BID  - low dose sliding scale insulin, check FSG AC, HS  - hypoglycemia protocol    # HLD  - continue PTA atorvastatin    # Anxiety  Currently denies using any home medications.  - atarax PRN    # Alcohol misuse, ? abuse  - consider chem dep consult    # Nicotine dependence  Currently vapes, no longer smoking tobacco  - nicotine patch    # Health Care Maintenance  - continue PTA aspirin 81 mg daily    FEN: Cardiac diet  Ppx: None indicated  Dispo: Inpatient  CODE: Full    Patient will be staffed in the AM.    Burton Chung MD  Internal Medicine PGY-2  Pager [phone]         Chief Complaint:   Shortness of  breath         HPI:   Eliseo Sims is a 43 year old male with a h/o HTN, HLD, and DM II who initially presented from urgent care to the ED for worsening shortness of breath over the past week, initial concern for PE, but was found to have flash pulmonary edema 2/2 to hypertensive urgency.    He describes having worsening shortness of breath since 2/25/19, has actually been gradual for the past 2 weeks. He notes that he woke up short of breath 3 days ago, normally sleeps flat at night, worse with exertion. Initially thought it was a panic attack, but then had it recurred today. He denies any associated chest pain, no recent fevers, chills, productive cough, swelling in legs or belly. He notes that has a prior diagnosis of HTN, DMII, and HLD. He reports not taking BP meds for over 8 months, could not specify a particular reason why. Significant other at bedside notes that he has had a lot of issues with anxiety and may be drinking more than he states. 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    In the [place] ED, BP was up to 210's/120's, HR 93, afebrile. EKG showed no ischemic changes, troponin negative, D-dimer negative. Chest x-ray showed new b/l interstitial opacities, concerning for flash pulmonary edema with hypertensive urgency. He was given lasix, labetalol, and hydralazine, was restarted on PTA Prinzide as well with improvement of 's/70's.         Past Medical History:     Past Medical History:   Diagnosis Date     Essential hypertension      Hyperlipidemia LDL goal <100      Type 2 diabetes mellitus without complication (H)      Last ECHO:   None         Past Surgical History:     Past Surgical History:   Procedure Laterality Date     APPENDECTOMY  1990    age 15           Social History:     Social History     Socioeconomic History     Marital status:      Spouse name: Not on file     Number of children: 1     Years of education: Not on file     Highest education  level: Not on file   Occupational History     Not on file   Social Needs     Financial resource strain: Not on file     Food insecurity:     Worry: Not on file     Inability: Not on file     Transportation needs:     Medical: Not on file     Non-medical: Not on file   Tobacco Use     Smoking status: Former Smoker     Packs/day: 1.50     Years: 16.00     Pack years: 24.00     Types: Cigarettes     Last attempt to quit: 2013     Years since quittin.8     Smokeless tobacco: Never Used   Substance and Sexual Activity     Alcohol use: Yes     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      Drug use: No     Sexual activity: Yes     Partners: Female   Lifestyle     Physical activity:     Days per week: Not on file     Minutes per session: Not on file     Stress: Not on file   Relationships     Social connections:     Talks on phone: Not on file     Gets together: Not on file     Attends Sikhism service: Not on file     Active member of club or organization: Not on file     Attends meetings of clubs or organizations: Not on file     Relationship status: Not on file     Intimate partner violence:     Fear of current or ex partner: Not on file     Emotionally abused: Not on file     Physically abused: Not on file     Forced sexual activity: Not on file   Other Topics Concern     Parent/sibling w/ CABG, MI or angioplasty before 65F 55M? No   Social History Narrative     Not on file     Per ED note, patient had 10 drinks yesterday, he did not quantify how much he drinks per week on my exam. He currently vapes. No other drug use.         Family History:     Family History   Problem Relation Age of Onset     Family History Negative Father      Arthritis Mother           Immunizations:   There is no immunization history for the selected administration types on file for this patient.           Allergies:      Allergies   Allergen Reactions     Penicillins           Medications:     Prior to Admission Medications    Prescriptions Last Dose Informant Patient Reported? Taking?   BL LANCETS MISC More than a month at Unknown time  No No   Si Device 2 times daily.   Patient not taking: Reported on 2017   Blood Glucose Monitoring Suppl (BLOOD GLUCOSE METER) KIT More than a month at Unknown time  No No   Si Device 2 times daily.   Patient not taking: Reported on 2017   NEW MED More than a month at Unknown time  Yes No   Simg. Dose E cigs.    UNABLE TO FIND More than a month at Unknown time  No No   Sig: Please give pt. Either brand name Roche or [employer] Diabetic Kit.( Per his insurance.)  Also fill Test stripes:  He test up to 3 times a day. (per his insurance)  Lancets:  Up to 3 times a day (per his insurance)   Patient not taking: Reported on 2017   acetaminophen (TYLENOL) 325 MG tablet Unknown at Unknown time  No No   Sig: Take 2 tablets (650 mg) by mouth every 4 hours as needed for mild pain   aspirin 81 MG tablet More than a month at Unknown time  No No   Sig: Take 1 tablet (81 mg) by mouth daily   atorvastatin (LIPITOR) 10 MG tablet More than a month at Unknown time  No No   Sig: Take 1 tablet (10 mg) by mouth daily   cyclobenzaprine (FLEXERIL) 10 MG tablet More than a month at Unknown time  No No   Sig: Take 0.5-1 tablets (5-10 mg) by mouth 2 times daily as needed for muscle spasms   diclofenac (VOLTAREN) 75 MG EC tablet More than a month at Unknown time  No No   Sig: Take 1 tablet (75 mg) by mouth 2 times daily as needed for moderate pain   glucose blood VI test strips (ONE TOUCH ULTRA TEST) strip More than a month at Unknown time  No No   Sig: Use to test blood sugars two times daily or as directed.   Patient not taking: Reported on 2017   lisinopril-hydrochlorothiazide (PRINZIDE/ZESTORETIC) 20-25 MG per tablet More than a month at Unknown time  No No   Sig: Take 1 tablet by mouth daily   metFORMIN (GLUCOPHAGE) 1000 MG tablet More than a month at Unknown time  No No   Sig: Take 1 tablet (1,000 mg)  "by mouth 2 times daily (with meals)      Facility-Administered Medications: None           Review of Systems:   A complete, 10 point ROS was performed and is negative other than what is stated in the HPI.         Physical Exam:   Blood pressure 131/71, pulse 90, temperature 98.1  F (36.7  C), temperature source Oral, resp. rate 22, height 1.689 m (5' 6.5\"), weight 94.3 kg (208 lb), SpO2 97 %.  Estimated Dry Weight - unknown    General:  Alert, lying in bed, no acute distress.  HEENT: Atraumatic, anicteric sclera, extraocular motion intact, nares dry, mucus membranes moist.  CV: Normal rate, regular rhythm. S1, S2+.  No murmurs, rubs or gallops. JVD not appreciated.  Resp: Fine crackles bilaterally, no accessory muscle use.  Abdomen: Bowel sounds +, nondistended, soft, nontender, no hepatosplenomegaly, no masses.  Extremities: No peripheral edema, warm and well perfused, capillary refill < 2 seconds  Skin: No rashes, bruising, or jaundice.  Neuro: Alert, oriented, symmetric facial expressions, moving extremities equally  Psych: Appropriate affect.         Data:   Labs and Imaging Reviewed in Chart   Pertinent studies as below:  Last Comprehensive Metabolic Panel:  Sodium   Date Value Ref Range Status   02/28/2019 138 133 - 144 mmol/L Final     Potassium   Date Value Ref Range Status   02/28/2019 4.3 3.4 - 5.3 mmol/L Final     Chloride   Date Value Ref Range Status   02/28/2019 102 94 - 109 mmol/L Final     Carbon Dioxide   Date Value Ref Range Status   02/28/2019 27 20 - 32 mmol/L Final     Anion Gap   Date Value Ref Range Status   02/28/2019 9 3 - 14 mmol/L Final     Glucose   Date Value Ref Range Status   02/28/2019 294 (H) 70 - 99 mg/dL Final     Urea Nitrogen   Date Value Ref Range Status   02/28/2019 10 7 - 30 mg/dL Final     Creatinine   Date Value Ref Range Status   02/28/2019 0.87 0.66 - 1.25 mg/dL Final     GFR Estimate   Date Value Ref Range Status   02/28/2019 >90 >60 mL/min/[1.73_m2] Final     Comment:    "  Non  GFR Calc  Starting 12/18/2018, serum creatinine based estimated GFR (eGFR) will be   calculated using the Chronic Kidney Disease Epidemiology Collaboration   (CKD-EPI) equation.       Calcium   Date Value Ref Range Status   02/28/2019 8.7 8.5 - 10.1 mg/dL Final     Lab Results   Component Value Date    WBC 9.6 02/28/2019     Lab Results   Component Value Date    RBC 4.88 02/28/2019     Lab Results   Component Value Date    HGB 14.6 02/28/2019     Lab Results   Component Value Date    HCT 41.8 02/28/2019     Lab Results   Component Value Date    MCV 86 02/28/2019     Lab Results   Component Value Date    MCH 29.9 02/28/2019     Lab Results   Component Value Date    MCHC 34.9 02/28/2019     Lab Results   Component Value Date    RDW 12.7 02/28/2019     Lab Results   Component Value Date     02/28/2019     Chest x-ray (2/28):  IMPRESSION:  Two views of the chest are performed. Bilateral  interstitial opacities are noted concerning for pulmonary edema. No  definite pleural effusions. Heart size is within normal limits. No  pneumothorax. Slight increased opacity right upper lobe could indicate  superimposed pneumonia. Correlate with patient's clinical picture.

## 2019-03-01 NOTE — PROGRESS NOTES
"  Cardiology Progress Note  Interval:  No acute events overnight   Patient weaned of oxygen, lung sounds are clear, no edema   /90 mmHg   No more SOB, no chest pain, palpitation, headache, change in vision, syncope    Plan:   -Stop Hydralazine  -Start aldosterone-hydrochlorothiazide 25/25mg   -Increase Lisinopril 20 mg >> 40 mg daily   -Start amlodipine 2.5 mg daily (at bedtime when discharged)   -TTE complete   -Goal BP <120/90  -Endocrine for diabetes counseling and recommendations (Metforming 500 mg BID with plan to titrate to 1000 mg BID)   -Arrange primary care doctor and follow up (This coming Wednesday)     Objective:  Most recent vital signs:  /88   Pulse 85   Temp 98.5  F (36.9  C) (Oral)   Resp 16   Ht 1.676 m (5' 6\")   Wt 87.3 kg (192 lb 8 oz)   SpO2 96%   BMI 31.07 kg/m    Temp:  [98.1  F (36.7  C)-98.9  F (37.2  C)] 98.5  F (36.9  C)  Pulse:  [] 85  Heart Rate:  [79-98] 83  Resp:  [14-37] 16  BP: (113-210)/() 129/88  SpO2:  [87 %-99 %] 96 %  Wt Readings from Last 2 Encounters:   03/01/19 87.3 kg (192 lb 8 oz)   07/14/17 108.9 kg (240 lb)       Intake/Output Summary (Last 24 hours) at 3/1/2019 0733  Last data filed at 3/1/2019 0636  Gross per 24 hour   Intake 480 ml   Output 1700 ml   Net -1220 ml     Physical exam:  General: In bed, in NAD  HEENT: EOMI, PERRLA, no scleral icterus or injection  CARDIAC: RRR, no m/r/g appreciated. Peripheral pulses 2+  RESP: CTAB, no wheezes, rhonchi or crackles appreciated.  GI: NABS, NT/ND, no guarding or rebound  :   EXTREMITIES: NO LE edema, pulses 2+.  No bruits appreciated.   SKIN: No acute lesions appreciated  NEURO: Alert and oriented X3, CN II-XII grossly intact, no focal neurological deficits noted    Labs (Past three days):  CBC  Recent Labs   Lab 03/01/19  0417 02/28/19  1323   WBC 8.9 9.6   RBC 4.61 4.88   HGB 13.6 14.6   HCT 39.6* 41.8   MCV 86 86   MCH 29.5 29.9   MCHC 34.3 34.9   RDW 13.1 12.7    250     BMP  Recent " "Labs   Lab 03/01/19  0417 02/28/19  1323    138   POTASSIUM 3.0* 4.3   CHLORIDE 100 102   CO2 26 27   ANIONGAP 12 9   * 294*   BUN 12 10   CR 1.11 0.87   GFRESTIMATED 82 >90   GFRESTBLACK >90 >90   ROME 8.6 8.7     Troponins:     INR  Recent Labs   Lab 02/28/19  1323   INR 1.01     Liver panel  Recent Labs   Lab 02/28/19  1323   PROTTOTAL 7.1   ALBUMIN 3.1*   BILITOTAL 0.8   ALKPHOS 81   AST 28   ALT 40       Imaging/procedure results:  Normal biventricular function, chamber size, wall motion, and wall  thickness.The EF is 60-65%.  Left ventricular hypertrophy and possible diastolic function but IVC is not  dilated.  No hemodynamically significant valvular anomalies.     Previous study not available for comparison.      Assessment & Plan:  # Hypertensive urgency c/b flash pulmonary edema  Patient has been noncompliant with PTA medications which includes Prinzide \"for years.\" Has also endorsed alcohol use, but was not forthcoming on how much he drinks on a weekly basis. 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 No known prior history of heart failure or lung disease. Initial concern for PE from urgent care, though D-dimer negative, troponin negative. TTE today showed thick ventricles but normal function. After getting better control of his BP and IV diuretic patient now with clear lung sounds, no edema, normal WOB, and feeling improved.   -Lisinopril 20 >> 40 mg daily   -Started Aldosterone-hydrochlorothiazide 25/25   -Started Amlodipine 2.5 mg   -Stopped hydralazine and lasix   - goal SBP < 120/90  - counseled on reducing EtOH consumption  - will need f/u with PCP (next week, arranged) and cardiology (1-2 months)      # DM II  Last A1c 6.7 in 7/2017. Here 10.1%. Endocrine saw. Patient states he was told he no longer needed to take metformin. Advised him he should resume this and plan is for 500 mg BID and PCP follow up to titrate. Told " him that he should drink only in moderate due to the carbs/sugar content and also liver function on metformin.   - Metformin 500 mg BID  - low dose sliding scale insulin, check FSG AC, HS  - hypoglycemia protocol  - PCP follow up next week as arranged   - Lifestyle modificaitons      # HLD  - continue PTA atorvastatin     # Anxiety  Currently denies using any home medications.  - atarax PRN     # Alcohol misuse, ? abuse  - consider chem dep consult     # Nicotine dependence  Currently vapes, no longer smoking tobacco  - nicotine patch     # Health Care Maintenance  - continue PTA aspirin 81 mg daily        Patient seen and discussed w/ Dr. Lindsay.    Dionicio LEWIS  [facility]  Cardiology  [phone]

## 2019-03-01 NOTE — CONSULTS
NEW INPATIENT DIABETES MANAGEMENT CONSULT  Eliseo Sims  Age: 43 year old  MRN # [id number]   YOB: 1975    Chief Complaint: shortness of breath  Reason for Consult: hyperglycemia, history of TIIDM  Consulting Provider: Dionicio Chávez PA-C    History of Present Illness:   Cortes is a 43 year old male who presented for evaluation of worsening shortness of breath on 2/28 in the setting of antihypertensive noncompliance x 8 months. He was found to be in flash pulmonary edema 2/2 hypertensive urgency.     Upon routine lab check, his glucose on admission was 367. He endorsed not being on any medications currently for diabetes. He does have a history of Type II diabetes diagnosed years ago for which he initially was on Metformin and Glipizide. He had good control with these medications, and with diet/exercise also lost ~100 lbs since his diagnosis. In PCP follow up in 7/2017, his A1c was 6.7% after stopping his medications a couple months prior without his provider's knowledge. Since he had lost a lot of weight, and his A1c was within goal, his PCP allowed him to continue off meds. He endorses that since then he has been off medications for diabetes and had not seen a provider since.     The patient endorsed some increased thirst, but no polyuria, unexpected weight loss, fatigue, blurred vision, or shakiness. He did have these symptoms upon his initial diagnosis 8 years ago. He is agreeable to resuming metformin.     Upon admission, his alcohol use was brought into question. 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 There was a concern that he was under representing his alcohol intake, and his wife confirms that he will occasionally binge drink. We discussed the risk of resuming Metformin if there is ongoing significant EtOH intake, and he endorsed that he is going to reduce his intake to less than 2 beers daily. He denies any history of alcohol withdrawal or cravings, and does not  "seem concerned with his use.     Other Active Medical Problems: pulmonary edema with SOB, hypertensive urgency.   Diabetes Mellitus Type: II  Duration:  8   years  Diabetic Complications: none known   Prior to Admission Diabetes Regimen: has been on metformin 1000 mg BID and glipizide (unknown dose) in the past, not on meds fr two years.  Usual BG control PTA: uncontrolled, with A1c 10.1 on 2/28/19  History of DKA: no  Able to Detect Hypoglycemia: no  Usual Diabetes Care Provider: PCP (Yenny in [place])  Primary Care Provider: Physician No Ref-Primary  Factors Impacting IP Glucose Control: reduced mobility   Current Diet: Orders Placed This Encounter      Combination Diet 2 gm NA Diet; No Caffeine Diet; No Caffeine for 24 hours (once tests completed, may have caffeine)  Level of activity: poor    10 point ROS completed with pertinent positives and negatives noted in the HPI  Past medical, family and social histories are reviewed and updated.    Social History    Tobacco: current E-cig use     Alcohol: 2-3 drinks per day, some bingeing on the weekends    Marital Status:  (Kimber)     Place of Residence: [place]    Occupation: bar owner    Family History  No known FHx diabetes     Physical Exam   /88   Pulse 85   Temp 98.5  F (36.9  C) (Oral)   Resp 16   Ht 1.676 m (5' 6\")   Wt 87.3 kg (192 lb 8 oz)   SpO2 96%   BMI 31.07 kg/m    General: pleasant, in no distress.   HEENT: normocephalic, atraumatic. Oral mucous membranes moist.   Lungs: unlabored respiration, no cough  ABD: rounded, soft, no lipodystrophy noted  Skin: warm and dry, no obvious lesions  MSK:  moves all extremities  Lymp:  no LE edema   Mental status:  alert, oriented to self, place, time  Psych:  affect, calm and appropriate interaction     Most Recent Laboratory Tests:  Recent Labs   Lab 03/01/19 0417   HGB 13.6     Recent Labs   Lab 03/01/19 0417   A1C 10.1*     Recent Labs   Lab 03/01/19 0417   CR 1.11     Recent " Labs   Lab 03/01/19  1202 03/01/19  0748 03/01/19  0417 03/01/19  0334 02/28/19  1323 02/28/19  1222   GLC  --   --  224*  --  294*  --    * 217*  --  221*  --  367*       Assessment: Uncontrolled Type II DM (A1c 10.1)    Patient with prior history of TIIDM initially well controlled (A1c 6.7%) on Metformin and Glipizide. Meds were discontinued after patient lost a significant amount of weight, and was able to manage with diet and activity.     Patient presented in flash pulmonary edema due to hypertensive urgency. He did have BG of 367 on admission, improved to 217 prior to any antidiabetic meds being given, supporting some stress hyperglycemia. His A1c is now 10.1%, so re-initiation of medications are indicated. Unfortunately, concern has been raised with the patient's level of alcohol use. Primary team reporting the patient was not forthcoming with EtOH use. He endorsed typical use of ~2-3 alcoholic beverages per day, with occasional higher amounts on weekends (patient owns a bar). If his daily alcohol use exceeds 2-3 drinks per day, he is at risk for hepatic and renal complications with coinciding use of Metformin (including a higher risk of lactic acidosis). This risk is similar with other oral diabetic medications. If there is a significant concern with an ongoing pattern of alcoholism, the patient should be started on insulin (low dose glargine). After discussion with patient, he reports commitment to minimize his EtOH use to two drinks per day. He is also committed to close follow up with a primary provider.     Plan while hospitalized:    -recommend re-initiation of Metformin at a slower taper (i.e. 500 mg BID, ordered for you). GFR must remain >45 to continue use.   - increase to moderate intensity sliding scale while hospitalized   -BG monitoring TID AC, HS, 0200   -hypoglycemia protocol   -consistent diet with carb counting protocol   -dietician consult to review consistent carb diet with  "patient    Plan for Discharge: instructions posted for patient in AVS   -continue Metformin 500mg BID until follow up with PCP next week (scheduled already)   -he may require an additional agent (or transition to insulin)in follow up to control BG.    -discontinue sliding scale insulin   -continue consistent carb diet   -BG monitoring three times daily before meals, and bring numbers to follow up appointment.    On discharge, please order a new glucometer, test strips, and lancets for patient. In pharmacy note, can place comment \"ok to substitute per insurance coverage\"    Discussed plan of care with patient and primary team   Thank you for this consult; Inpatient Diabetes will sign off.     Diabetes Management Team job code: [id number]    I spent a total of 80 minutes bedside and on the inpatient unit managing glycemic care. Over 50% of my time on the unit was spent counseling the patient and/or coordinating care regarding acute hyperglycemia management.  See note for details.    Casandra Andrew PA-C  Inpatient Diabetes Management Service  Pager [phone]        "

## 2019-03-01 NOTE — PLAN OF CARE
D: Pt admitted to unit last evening for SOB. PMHx HTN, HLD, DM2, and alcohol abuse.     I/A: BP stable, 130s-140s/80s, On 2L NC sating >95%. Denies SOB. LS diminished. SR, 80s-90s. Denies chest pain. AOx4. 2g Na diet and no caffeine. On carb counts. BG in 200s. Voiding spontaneously. Last BM 2/27. Up independently. Slept between cares. Atarax givenx1. Nicotine patch in place on L deltoid.     P: Continue to monitor and follow POC.

## 2019-03-01 NOTE — PLAN OF CARE
Pt admitted to unit around 2200.   Diagnosis: SOB and hypertension  Admitted from: [place] ED  Via: Stretcher in ambulance  Accompanied by: Spouse  Belongings: Placed in closet (clothing, shoes, wallet, cellphone, cellphone , baseball cap); vap pen sent home with family, declined sending any items to security.  Admission Profile: Complete  Teaching: orientation to unit, call don't fall, use of console, meal times, visiting hours, when to call for the RN (angina/sob/dizziness, etc.), and enforced importance of safety.   Access: PIV  Telemetry: Placed on patient  Height/Weight: Complete

## 2019-03-01 NOTE — PROGRESS NOTES
Care Coordinator Progress Note    Admission Date/Time:  2/28/2019  Attending MD:  Rajni Lindsay MD  Data  Chart reviewed, discussed with interdisciplinary team.   Patient was admitted for:    Acute systolic congestive heart failure (H)  Essential hypertension.    Concerns with insurance coverage for discharge needs: None stated by pt.  Current Living Situation: Patient lives with spouse. Pt said that he is independent with his own care.   Support System: Supportive and Involved wife.   Services Involved: none currently.   Transportation at Discharge: Family or friend will provide  Transportation to Medical Appointments: self or wife.    - Name of caregiver: self.   Barriers to Discharge: medical condition.     Coordination of Care and Referrals: Provided patient/family with options for new PCP.    Assessment  Per MD, pt will need to be set up with a PCP next week; pt is in agreement with this plan and pt wants to go to the [facility].   Intervention:   Appointment made at the [facility] (Ph: [phone] Fax: [phone]) on 3/6/19 at 10 AM with Dr. Burton Cummings for establishment of care and post hospitalization follow up.    Plan  Anticipated Discharge Date:  3/2/19  Anticipated Discharge Plan:  Discharge to home with outpt f/u.     LULU SHINE RN BSN  Care Coordinator Unit   [phone]

## 2019-03-01 NOTE — DISCHARGE INSTRUCTIONS
IP Diabetes Management Team Discharge Instructions    Glucose Control Regimen:   Resume Metformin 500 mg twice daily; the doses can be increased per your primary doctor in follow up if you are tolerating.     Blood Glucose Checks: three times daily before meals. Record these numbers and bring them to your follow up appointment.     We recommend that you minimize your alcohol intake to 0-2 drinks per day to prevent side effects (lactic acidosis, kidney or liver failure) while on Metformin.    Outpatient follow up: a primary care appointment has been scheduled for you next week.     Thank you for letting the Diabetes Management Team be involved in your care!    _____________________________________________________________________________________________________________________________    RN, please fax discharge orders to Dr. John Cummings at [facility] (Fax: [phone]). Signed medical information release form is in patient's chart.

## 2019-03-02 VITALS
OXYGEN SATURATION: 96 % | HEIGHT: 66 IN | RESPIRATION RATE: 16 BRPM | TEMPERATURE: 98.5 F | WEIGHT: 192.5 LBS | HEART RATE: 85 BPM | DIASTOLIC BLOOD PRESSURE: 98 MMHG | BODY MASS INDEX: 30.94 KG/M2 | SYSTOLIC BLOOD PRESSURE: 147 MMHG

## 2019-03-02 LAB
ANION GAP SERPL CALCULATED.3IONS-SCNC: 9 MMOL/L (ref 3–14)
BUN SERPL-MCNC: 17 MG/DL (ref 7–30)
CALCIUM SERPL-MCNC: 9 MG/DL (ref 8.5–10.1)
CHLORIDE SERPL-SCNC: 101 MMOL/L (ref 94–109)
CO2 SERPL-SCNC: 25 MMOL/L (ref 20–32)
CREAT SERPL-MCNC: 1.07 MG/DL (ref 0.66–1.25)
ERYTHROCYTE [DISTWIDTH] IN BLOOD BY AUTOMATED COUNT: 12.9 % (ref 10–15)
GFR SERPL CREATININE-BSD FRML MDRD: 85 ML/MIN/{1.73_M2}
GLUCOSE BLDC GLUCOMTR-MCNC: 140 MG/DL (ref 70–99)
GLUCOSE BLDC GLUCOMTR-MCNC: 196 MG/DL (ref 70–99)
GLUCOSE BLDC GLUCOMTR-MCNC: 197 MG/DL (ref 70–99)
GLUCOSE SERPL-MCNC: 166 MG/DL (ref 70–99)
HCT VFR BLD AUTO: 43.6 % (ref 40–53)
HGB BLD-MCNC: 14.7 G/DL (ref 13.3–17.7)
MCH RBC QN AUTO: 29.5 PG (ref 26.5–33)
MCHC RBC AUTO-ENTMCNC: 33.7 G/DL (ref 31.5–36.5)
MCV RBC AUTO: 88 FL (ref 78–100)
PLATELET # BLD AUTO: 324 10E9/L (ref 150–450)
POTASSIUM SERPL-SCNC: 4.3 MMOL/L (ref 3.4–5.3)
RBC # BLD AUTO: 4.98 10E12/L (ref 4.4–5.9)
SODIUM SERPL-SCNC: 135 MMOL/L (ref 133–144)
WBC # BLD AUTO: 9.4 10E9/L (ref 4–11)

## 2019-03-02 PROCEDURE — 25000132 ZZH RX MED GY IP 250 OP 250 PS 637: Performed by: PHYSICIAN ASSISTANT

## 2019-03-02 PROCEDURE — 25000132 ZZH RX MED GY IP 250 OP 250 PS 637: Performed by: STUDENT IN AN ORGANIZED HEALTH CARE EDUCATION/TRAINING PROGRAM

## 2019-03-02 PROCEDURE — 25000132 ZZH RX MED GY IP 250 OP 250 PS 637: Performed by: INTERNAL MEDICINE

## 2019-03-02 PROCEDURE — 36415 COLL VENOUS BLD VENIPUNCTURE: CPT | Performed by: PHYSICIAN ASSISTANT

## 2019-03-02 PROCEDURE — 00000146 ZZHCL STATISTIC GLUCOSE BY METER IP

## 2019-03-02 PROCEDURE — 90686 IIV4 VACC NO PRSV 0.5 ML IM: CPT | Performed by: PHYSICIAN ASSISTANT

## 2019-03-02 PROCEDURE — 85027 COMPLETE CBC AUTOMATED: CPT | Performed by: PHYSICIAN ASSISTANT

## 2019-03-02 PROCEDURE — 25000128 H RX IP 250 OP 636: Performed by: PHYSICIAN ASSISTANT

## 2019-03-02 PROCEDURE — 99239 HOSP IP/OBS DSCHRG MGMT >30: CPT | Mod: GC | Performed by: INTERNAL MEDICINE

## 2019-03-02 PROCEDURE — 80048 BASIC METABOLIC PNL TOTAL CA: CPT | Performed by: PHYSICIAN ASSISTANT

## 2019-03-02 RX ORDER — LISINOPRIL 40 MG/1
40 TABLET ORAL DAILY
Qty: 30 TABLET | Refills: 3 | Status: SHIPPED | OUTPATIENT
Start: 2019-03-03

## 2019-03-02 RX ORDER — AMLODIPINE BESYLATE 10 MG/1
10 TABLET ORAL DAILY
Qty: 30 TABLET | Refills: 3 | Status: SHIPPED | OUTPATIENT
Start: 2019-03-03

## 2019-03-02 RX ORDER — BLOOD-GLUCOSE METER
EACH MISCELLANEOUS
Qty: 1 KIT | Refills: 0 | Status: SHIPPED | OUTPATIENT
Start: 2019-03-02

## 2019-03-02 RX ORDER — SPIRONOLACTONE AND HYDROCHLOROTHIAZIDE 25; 25 MG/1; MG/1
1 TABLET ORAL DAILY
Qty: 30 TABLET | Refills: 3 | Status: SHIPPED | OUTPATIENT
Start: 2019-03-03

## 2019-03-02 RX ADMIN — NICOTINE 1 PATCH: 14 PATCH, EXTENDED RELEASE TRANSDERMAL at 00:25

## 2019-03-02 RX ADMIN — INFLUENZA A VIRUS A/MICHIGAN/45/2015 X-275 (H1N1) ANTIGEN (FORMALDEHYDE INACTIVATED), INFLUENZA A VIRUS A/SINGAPORE/INFIMH-16-0019/2016 IVR-186 (H3N2) ANTIGEN (FORMALDEHYDE INACTIVATED), INFLUENZA B VIRUS B/PHUKET/3073/2013 ANTIGEN (FORMALDEHYDE INACTIVATED), AND INFLUENZA B VIRUS B/MARYLAND/15/2016 BX-69A ANTIGEN (FORMALDEHYDE INACTIVATED) 0.5 ML: 15; 15; 15; 15 INJECTION, SUSPENSION INTRAMUSCULAR at 12:56

## 2019-03-02 RX ADMIN — LISINOPRIL 40 MG: 20 TABLET ORAL at 09:06

## 2019-03-02 RX ADMIN — ASPIRIN 81 MG CHEWABLE TABLET 81 MG: 81 TABLET CHEWABLE at 09:06

## 2019-03-02 RX ADMIN — AMLODIPINE BESYLATE 7.5 MG: 5 TABLET ORAL at 12:56

## 2019-03-02 RX ADMIN — SPIRONOLACTONE AND HYDROCHLOROTHIAZIDE 1 TABLET: 25; 25 TABLET, FILM COATED ORAL at 09:06

## 2019-03-02 RX ADMIN — METFORMIN HYDROCHLORIDE 500 MG: 500 TABLET, FILM COATED ORAL at 09:06

## 2019-03-02 RX ADMIN — AMLODIPINE BESYLATE 2.5 MG: 2.5 TABLET ORAL at 09:06

## 2019-03-02 RX ADMIN — ATORVASTATIN CALCIUM 10 MG: 10 TABLET, FILM COATED ORAL at 09:06

## 2019-03-02 ASSESSMENT — ACTIVITIES OF DAILY LIVING (ADL)
ADLS_ACUITY_SCORE: 11

## 2019-03-02 NOTE — PLAN OF CARE
Neuro: A&Ox4.   Cardiac: SR. VSS.  BP improving 129-149/87-88.   Respiratory: Sating >92% on RA. Denies CP  GI/: Adequate urine output. Last BM 2/28/19  Diet/appetite: Tolerating low sodium diet. Eating well.  Activity:  Up ad christy  Pain: Denies  Skin: No new deficits noted.  LDA's: L PIV    Plan: BP meds tweaked by primary team.  Monitor overnight and plan on discontinue tomorrow.  K+ replaced.  Continue with POC. Notify primary team with changes.

## 2019-03-02 NOTE — PLAN OF CARE
Pt. is alert and oriented x 4 denies pain and nausea ,up independently,LS clear,BS+,voided adequately,no BM,denies numbness and tingling in his extremities./98 ,blood pressure medication given per order. ,2 units of insulin given per sliding scale.PIV discontinued.discharge instructions discussed  with pt.and his wife .They verbalized understanding patient will  discharge medication from his pharmacy. pt's wife here to provide transportation.Pt. discharged home in stable condition.

## 2019-03-02 NOTE — DISCHARGE SUMMARY
[address] [address]  p: [phone]    Discharge Summary: Cardiology Service    Eliseo Sims MRN# [id number]   YOB: 1975 Age: 43 year old       Admission Date: 2/28/2019  Discharge Date: 03/02/19    Discharge Diagnoses:  1. Hypertensive urgency complicated by flash pulmonary edema  2. Hypertension  3. Type II diabetes    Consults:  Endocrinology    Imaging with results:  Transthoracic echocardiogram complete 3/1/2019  Interpretation Summary  Normal biventricular function, chamber size, wall motion, and wall thickness.The EF is 60-65%. Left ventricular hypertrophy and possible diastolic function but IVC is not dilated. No hemodynamically significant valvular anomalies. Previous study not available for comparison.    Global and regional left ventricular function is normal with an EF of 55-60%. Left ventricular size is normal. Mild concentric wall thickening consistent with left ventricular hypertrophy is present. Left ventricular diastolic function is indeterminate. Diastolic Doppler findings (E/E' ratio and/or other parameters) suggest left ventricular filling pressures are increased. Right ventricular function, chamber size, wall motion, and thickness are normal. Both atria appear normal. The atrial septum is intact as assessed by color Doppler. The mitral valve is normal. Mild mitral insufficiency is present. Aortic valve is normal in structure and function. The aortic valve is tricuspid. Trace aortic insufficiency is present.   The tricuspid valve is normal. The peak velocity of the tricuspid regurgitant jet is not obtainable. Pulmonary artery systolic pressure cannot be assessed. The pulmonic valve is normal. The inferior vena cava is normal. The aorta root is normal. The thoracic aorta is normal. The pulmonary artery is normal. No pericardial effusion is present.    Other imaging studies:  CXR  2/28/2019  IMPRESSION: Two views of the chest are performed. Bilateral interstitial opacities are noted concerning for pulmonary edema. No definite pleural effusions. Heart size is within normal limits. No pneumothorax. Slight increased opacity right upper lobe could indicate superimposed pneumonia. Correlate with patient's clinical picture.    Brief HPI:  Eliseo Sims is a 43 year old gentleman with history of hypertension, hyperlipidemia, and type II diabetes mellitus who initially presented to the emergency department with concern for 1 week of worsening shortness of breath, in the setting of having been off of all medications for the last 8 months.    Hospital Course by Diagnosis:  In the ED, he was found to be hypertensive to the 200s/100s, was started on oral hydrochlorothiazide-lisinopril (his previous home medication), and given IV labetalol and hydralazine, both of which helped with his symptoms. After findings consistent with pulmonary edema were identified on CXR, T wave abnormalities were found in V5 and V6, and a mild troponinemia was identified on labs, he was admitted for further management. After TTE demonstrated LVH, consistent with long-standing untreated hypertension, his antihypertensive regimen was titrated up to bring him closer to goal. By the morning of discharge, he was approaching goal BP, was symptomatically improved, and was comfortable with discharge to home with close follow-up (appointment with PCP on 3/6/2019). He was referred for cardiac MRI and subsequent cardiology follow-up for his LVH.     From an endocrine perspective, he presented with an elevated blood glucose (367), improving prior to initiation of hypoglycemics. Metformin monotherapy was re-initiated, he continued on a carb consistent diet, and he was provided with sliding scale insulin while inpatient. He received additional diabetes education prior while inpatient. At discharge, he was prescribed a new  glucometer, test strips, and lancets, with directions to have close follow up with his primary care provider after discharge, to consider an additional agent to control his blood glucoses (HgbA1c 10.1% this admission.    Condition on discharge  Temp:  [98.5  F (36.9  C)-99  F (37.2  C)] 98.5  F (36.9  C)  Heart Rate:  [68-98] 82  Resp:  [16] 16  BP: (121-149)/(75-98) 147/98  SpO2:  [96 %-100 %] 96 %  General: Alert, interactive, NAD, seated at bedside  Eyes: sclerae anicteric, EOMI  Neck: JVP not detected seated upright  Cardiovascular: regular rate and rhythm, normal S1 and S2, no murmurs, gallops, or rubs  Resp: clear to auscultation bilaterally, no rales, wheezes, or rhonchi  GI: Soft, nontender, nondistended. +BS. No guarding.  Extremities: No LE edema, no cyanosis or clubbing,   Skin: Warm and dry, no jaundice or rash  Neuro: CN 2-12 intact, moves all extremities equally without focal neurologic deficits, gait normal    Medication Changes:  Start amlodipine  Start lisinopril  Start spironolactone-hydrochlorothiazide  Start metformin  Stop lisinopril-hydrochlorothiazide     Discharge medications:   Current Discharge Medication List      START taking these medications    Details   amLODIPine (NORVASC) 10 MG tablet Take 1 tablet (10 mg) by mouth daily  Qty: 30 tablet, Refills: 3    Associated Diagnoses: Essential hypertension      !! blood glucose (NO BRAND SPECIFIED) test strip Use to test blood sugar 1 times daily or as directed.  Qty: 50 strip, Refills: 0    Associated Diagnoses: Type 2 diabetes mellitus without complication, without long-term current use of insulin (H)      !! blood glucose monitoring (ACCU-CHEK MULTICLIX) lancets Use to test blood sugar 1 times daily or as directed.  Qty: 102 each, Refills: 0    Associated Diagnoses: Type 2 diabetes mellitus without complication, without long-term current use of insulin (H)      lisinopril (PRINIVIL/ZESTRIL) 40 MG tablet Take 1 tablet (40 mg) by mouth  daily  Qty: 30 tablet, Refills: 3    Associated Diagnoses: Essential hypertension      spironolactone-HCTZ (ALDACTAZIDE) 25-25 MG tablet Take 1 tablet by mouth daily  Qty: 30 tablet, Refills: 3    Associated Diagnoses: Essential hypertension       !! - Potential duplicate medications found. Please discuss with provider.      CONTINUE these medications which have CHANGED    Details   blood glucose monitoring (ACCU-CHEK RAQUEL PLUS) meter device kit Use to test blood sugar 1 times daily or as directed.  Qty: 1 kit, Refills: 0    Associated Diagnoses: Type 2 diabetes mellitus without complication, without long-term current use of insulin (H)      metFORMIN (GLUCOPHAGE) 1000 MG tablet Take 0.5 tablets (500 mg) by mouth 2 times daily (with meals)  Qty: 180 tablet, Refills: 3    Associated Diagnoses: Type 2 diabetes mellitus without complication, without long-term current use of insulin (H)         CONTINUE these medications which have NOT CHANGED    Details   acetaminophen (TYLENOL) 325 MG tablet Take 2 tablets (650 mg) by mouth every 4 hours as needed for mild pain  Qty: 100 tablet, Refills: 0    Associated Diagnoses: Acute right-sided thoracic back pain      aspirin 81 MG tablet Take 1 tablet (81 mg) by mouth daily  Qty: 90 tablet, Refills: 3    Associated Diagnoses: Type 2 diabetes mellitus without complication, without long-term current use of insulin (H)      atorvastatin (LIPITOR) 10 MG tablet Take 1 tablet (10 mg) by mouth daily  Qty: 90 tablet, Refills: 3    Associated Diagnoses: Type 2 diabetes mellitus without complication, without long-term current use of insulin (H)      !! BL LANCETS MISC 1 Device 2 times daily.  Qty: 180 each, Refills: 3    Associated Diagnoses: Type 2 diabetes, HbA1c goal < 7% (H)      cyclobenzaprine (FLEXERIL) 10 MG tablet Take 0.5-1 tablets (5-10 mg) by mouth 2 times daily as needed for muscle spasms  Qty: 30 tablet, Refills: 0    Associated Diagnoses: Acute bilateral low back pain  without sciatica      diclofenac (VOLTAREN) 75 MG EC tablet Take 1 tablet (75 mg) by mouth 2 times daily as needed for moderate pain  Qty: 60 tablet, Refills: 1    Associated Diagnoses: Strain of lumbar region, subsequent encounter      !! glucose blood VI test strips (ONE TOUCH ULTRA TEST) strip Use to test blood sugars two times daily or as directed.  Qty: 100 strip, Refills: 3    Associated Diagnoses: Type 2 diabetes, HbA1c goal < 7% (H)      NEW MED 12mg. Dose E cigs.       UNABLE TO FIND Please give pt. Either brand name Roche or [employer] Diabetic Kit.( Per his insurance.)  Also fill Test stripes:  He test up to 3 times a day. (per his insurance)  Lancets:  Up to 3 times a day (per his insurance)  Qty: 270 each, Refills: 3    Comments: Profile Rx: patient will contact pharmacy when needed  Associated Diagnoses: Type 2 diabetes, HbA1c goal < 7% (H)       !! - Potential duplicate medications found. Please discuss with provider.      STOP taking these medications       lisinopril-hydrochlorothiazide (PRINZIDE/ZESTORETIC) 20-25 MG per tablet Comments:   Reason for Stopping:             Follow-up:  PCP 3/6/2019  Cardiac MRI in 1 months  Cardiology 1-2 months    Code status:  Full    Franck Gilbert MD  PGY-2, Internal Medicine  Cardiology Service

## 2019-03-02 NOTE — PLAN OF CARE
AVSS.  A&OX4.  BP improving.  120's-130's/70-80's.  Amlodipine given last evening per order.  Pt denies discomfort.  Slept without complaints.  Up independently.  BG<200.  Continues on Metformin.  Insulin per sliding scale. Tele: SR 60-90's.  Anticipate discharge home today.  Notify team with any issues/concerns.

## 2019-03-03 ENCOUNTER — PATIENT OUTREACH (OUTPATIENT)
Dept: CARE COORDINATION | Facility: CLINIC | Age: 44
End: 2019-03-03

## 2019-03-04 ENCOUNTER — PATIENT OUTREACH (OUTPATIENT)
Dept: CARE COORDINATION | Facility: CLINIC | Age: 44
End: 2019-03-04

## 2019-06-23 DIAGNOSIS — I10 ESSENTIAL HYPERTENSION: ICD-10-CM

## 2019-06-28 RX ORDER — LISINOPRIL 40 MG/1
TABLET ORAL
Qty: 30 TABLET | Refills: 2 | OUTPATIENT
Start: 2019-06-28

## 2019-06-28 RX ORDER — AMLODIPINE BESYLATE 10 MG/1
TABLET ORAL
Qty: 30 TABLET | Refills: 2 | OUTPATIENT
Start: 2019-06-28

## 2019-09-29 ENCOUNTER — HEALTH MAINTENANCE LETTER (OUTPATIENT)
Age: 44
End: 2019-09-29

## 2019-11-28 DIAGNOSIS — I10 ESSENTIAL HYPERTENSION: ICD-10-CM

## 2019-11-29 NOTE — TELEPHONE ENCOUNTER
AMLODIPINE BESYLATE 10 MG TAB      Last Written Prescription Date:  3-3-19  Last Fill Quantity: 30,   # refills: 3  Last Office Visit : Hospital   Future Office visit:  None    LISINOPRIL 40 MG TABLET      Last Written Prescription Date:  3-3-19  Last Fill Quantity: 30,   # refills: 3      Routing refill request to provider for review/approval because:  Last fill at hospital discharge : pt did not schedule appt, no PCP listed  ? RF,     ANNEI BP above protocol parameter    Scheduling has been notified to contact the pt for appointment.

## 2019-12-02 RX ORDER — AMLODIPINE BESYLATE 10 MG/1
10 TABLET ORAL DAILY
Qty: 90 TABLET | Refills: 0 | OUTPATIENT
Start: 2019-12-02

## 2019-12-02 RX ORDER — LISINOPRIL 40 MG/1
40 TABLET ORAL DAILY
Qty: 90 TABLET | Refills: 0 | OUTPATIENT
Start: 2019-12-02

## 2019-12-14 ENCOUNTER — TELEPHONE (OUTPATIENT)
Dept: CARDIOLOGY | Facility: CLINIC | Age: 44
End: 2019-12-14

## 2019-12-14 NOTE — TELEPHONE ENCOUNTER
----- Message from Viri Lantigua RN sent at 11/29/2019 10:52 AM CST -----  Regarding: scheduling pt of  Can  Please call to schedule.     Thanks     Due for RTC: post hospital discharge 3-3-19      I do not need any follow up on the scheduling of this appointment unless the patient will no longer be receiving care in our clinic.

## 2020-03-15 ENCOUNTER — HEALTH MAINTENANCE LETTER (OUTPATIENT)
Age: 45
End: 2020-03-15

## 2020-05-12 ENCOUNTER — MYC REFILL (OUTPATIENT)
Dept: CARDIOLOGY | Facility: CLINIC | Age: 45
End: 2020-05-12

## 2020-05-12 ENCOUNTER — E-VISIT (OUTPATIENT)
Dept: PEDIATRICS | Facility: CLINIC | Age: 45
End: 2020-05-12
Payer: COMMERCIAL

## 2020-05-12 DIAGNOSIS — I10 ESSENTIAL HYPERTENSION: ICD-10-CM

## 2020-05-12 DIAGNOSIS — N52.9 ERECTILE DYSFUNCTION, UNSPECIFIED ERECTILE DYSFUNCTION TYPE: Primary | ICD-10-CM

## 2020-05-12 PROCEDURE — 99421 OL DIG E/M SVC 5-10 MIN: CPT | Performed by: NURSE PRACTITIONER

## 2020-05-12 RX ORDER — LISINOPRIL 40 MG/1
40 TABLET ORAL DAILY
Qty: 30 TABLET | Refills: 3 | Status: CANCELLED | OUTPATIENT
Start: 2020-05-12

## 2020-05-12 RX ORDER — SILDENAFIL 50 MG/1
25 TABLET, FILM COATED ORAL DAILY PRN
Qty: 12 TABLET | Refills: 11 | Status: SHIPPED | OUTPATIENT
Start: 2020-05-12

## 2020-05-12 RX ORDER — AMLODIPINE BESYLATE 10 MG/1
10 TABLET ORAL DAILY
Qty: 30 TABLET | Refills: 3 | Status: CANCELLED | OUTPATIENT
Start: 2020-05-12

## 2021-01-14 ENCOUNTER — HEALTH MAINTENANCE LETTER (OUTPATIENT)
Age: 46
End: 2021-01-14

## 2021-05-09 ENCOUNTER — HEALTH MAINTENANCE LETTER (OUTPATIENT)
Age: 46
End: 2021-05-09

## 2021-08-29 ENCOUNTER — HEALTH MAINTENANCE LETTER (OUTPATIENT)
Age: 46
End: 2021-08-29

## 2021-10-24 ENCOUNTER — HEALTH MAINTENANCE LETTER (OUTPATIENT)
Age: 46
End: 2021-10-24

## 2021-12-19 ENCOUNTER — HEALTH MAINTENANCE LETTER (OUTPATIENT)
Age: 46
End: 2021-12-19

## 2022-02-13 ENCOUNTER — HEALTH MAINTENANCE LETTER (OUTPATIENT)
Age: 47
End: 2022-02-13

## 2022-04-10 ENCOUNTER — HEALTH MAINTENANCE LETTER (OUTPATIENT)
Age: 47
End: 2022-04-10

## 2022-07-31 ENCOUNTER — HEALTH MAINTENANCE LETTER (OUTPATIENT)
Age: 47
End: 2022-07-31

## 2022-10-15 ENCOUNTER — HEALTH MAINTENANCE LETTER (OUTPATIENT)
Age: 47
End: 2022-10-15

## 2022-11-27 ENCOUNTER — HEALTH MAINTENANCE LETTER (OUTPATIENT)
Age: 47
End: 2022-11-27

## 2023-03-26 ENCOUNTER — HEALTH MAINTENANCE LETTER (OUTPATIENT)
Age: 48
End: 2023-03-26

## 2023-08-20 ENCOUNTER — HEALTH MAINTENANCE LETTER (OUTPATIENT)
Age: 48
End: 2023-08-20

## 2024-01-07 ENCOUNTER — HEALTH MAINTENANCE LETTER (OUTPATIENT)
Age: 49
End: 2024-01-07

## 2024-05-26 ENCOUNTER — HEALTH MAINTENANCE LETTER (OUTPATIENT)
Age: 49
End: 2024-05-26